# Patient Record
Sex: FEMALE | Race: WHITE | NOT HISPANIC OR LATINO | Employment: FULL TIME | URBAN - METROPOLITAN AREA
[De-identification: names, ages, dates, MRNs, and addresses within clinical notes are randomized per-mention and may not be internally consistent; named-entity substitution may affect disease eponyms.]

---

## 2024-03-02 ENCOUNTER — LAB (OUTPATIENT)
Dept: LAB | Facility: LAB | Age: 39
End: 2024-03-02
Payer: COMMERCIAL
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
## 2024-03-21 ENCOUNTER — LAB (OUTPATIENT)
Dept: LAB | Facility: LAB | Age: 39
End: 2024-03-21
Payer: COMMERCIAL
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
## 2024-03-21 LAB
HCV AB SER QL: NONREACTIVE
HIV 1+2 AB+HIV1 P24 AG SERPL QL IA: NONREACTIVE

## 2024-03-21 PROCEDURE — 87389 HIV-1 AG W/HIV-1&-2 AB AG IA: CPT

## 2024-03-21 PROCEDURE — 86803 HEPATITIS C AB TEST: CPT

## 2024-04-19 ENCOUNTER — OFFICE VISIT (OUTPATIENT)
Dept: PRIMARY CARE | Facility: CLINIC | Age: 39
End: 2024-04-19
Payer: COMMERCIAL

## 2024-04-19 VITALS — OXYGEN SATURATION: 98 % | DIASTOLIC BLOOD PRESSURE: 64 MMHG | HEART RATE: 80 BPM | SYSTOLIC BLOOD PRESSURE: 122 MMHG

## 2024-04-19 DIAGNOSIS — Z00.00 HEALTH CARE MAINTENANCE: ICD-10-CM

## 2024-04-19 DIAGNOSIS — G43.709 CHRONIC MIGRAINE WITHOUT AURA WITHOUT STATUS MIGRAINOSUS, NOT INTRACTABLE: Primary | ICD-10-CM

## 2024-04-19 PROCEDURE — 99214 OFFICE O/P EST MOD 30 MIN: CPT | Performed by: INTERNAL MEDICINE

## 2024-04-19 RX ORDER — NAPROXEN SODIUM 550 MG/1
550 TABLET ORAL
Qty: 60 TABLET | Refills: 11 | Status: SHIPPED | OUTPATIENT
Start: 2024-04-19 | End: 2025-04-19

## 2024-04-19 RX ORDER — SERTRALINE HYDROCHLORIDE 50 MG/1
50 TABLET, FILM COATED ORAL DAILY
COMMUNITY
End: 2024-04-19 | Stop reason: ALTCHOICE

## 2024-04-19 RX ORDER — CLINDAMYCIN PHOSPHATE 10 MG/G
GEL TOPICAL
COMMUNITY
Start: 2024-03-01

## 2024-04-19 RX ORDER — KETOCONAZOLE 20 MG/ML
SHAMPOO, SUSPENSION TOPICAL
COMMUNITY
Start: 2024-03-01

## 2024-04-19 RX ORDER — VENLAFAXINE HYDROCHLORIDE 75 MG/1
75 CAPSULE, EXTENDED RELEASE ORAL DAILY
Qty: 30 CAPSULE | Refills: 11 | Status: SHIPPED | OUTPATIENT
Start: 2024-04-19 | End: 2025-04-19

## 2024-04-19 RX ORDER — PNV 119/IRON FUM/FOLIC ACID 29 MG-1 MG
TABLET ORAL
COMMUNITY
Start: 2019-03-20

## 2024-04-19 NOTE — ASSESSMENT & PLAN NOTE
Most likely fairly typical migraine headaches which are getting more frequent.  The intensity level has not changed particularly.  Given her multiple triggers I think preventative medication makes sense we will start her on venlafaxine 75 mg daily.  She is already taking sertraline so we will discontinue that.  We will also have her start using a long-acting decongestant antihistamine combination like Claritin-D and Flonase nasal spray and regular basis.  She will keep a headache diary and see if she can identify other triggers.  We also referred her for a massage for her neck muscles as well as possibly acupuncture.  She will report back to me in 30 days to see how her headaches are doing if they are better we will continue current treatment program if not we will increase the dose of venlafaxine.  Otherwise he may want to try different preventative medication.

## 2024-04-19 NOTE — PROGRESS NOTES
Subjective   Patient ID: Linda Bhardwaj is a 38 y.o. female who presents for No chief complaint on file..    Patient here for initial visit.  Her main complaint today is chronic headaches.  Patient has a history of chronic headaches which sounds like migraine going back to the time of puberty.  She describes fairly typical migrainous type symptoms that are mostly centered around the left orbit they are moderate in severity initially no more than once or twice a week but now getting much more frequent as often as 3-4 times a week.  When the headaches become more severe they are associated with some visual loss.  She has no obvious aura.  She has multiple triggers including alcohol coffee beans and other foods.  She also finds that when she has seasonal allergies and sinus pressure congestion this the headaches develop and get worse.  She takes Advil Cold and Sinus or Excedrin extra strength.  She has never been on any type of preventative regimen.  She has never been actually diagnosed with migraine headache prior to this.  She was seen by dentist who made a bite block for her because of some TMJ symptoms she had somebody who gave her some Botox injections which did give her some relief of her headaches.  The headaches are fairly typically related to her menstrual cycles.  And when she was will control pills she was headache free during the estrogen phase and developed headaches during the withdrawal period.  She has no other neurologic symptoms other than the visual loss.  She has no family history of migraine.  She is otherwise healthy she takes sertraline for chronic anxiety/depression she has a lot of stress due to her small children and some stresses at work.         Review of Systems    Objective   /64   Pulse 80   SpO2 98%     Physical Exam  HENT:      Head: Normocephalic and atraumatic.      Right Ear: Tympanic membrane normal.      Left Ear: Tympanic membrane normal.      Nose: Nose normal.       Mouth/Throat:      Mouth: Mucous membranes are moist.      Pharynx: Oropharynx is clear.   Eyes:      Extraocular Movements: Extraocular movements intact.      Conjunctiva/sclera: Conjunctivae normal.      Pupils: Pupils are equal, round, and reactive to light.      Comments: Fundi benign bilaterally   Cardiovascular:      Rate and Rhythm: Normal rate.      Pulses: Normal pulses.      Heart sounds: Normal heart sounds.         Assessment/Plan   Problem List Items Addressed This Visit             ICD-10-CM    Chronic migraine without aura without status migrainosus, not intractable - Primary G43.709     Most likely fairly typical migraine headaches which are getting more frequent.  The intensity level has not changed particularly.  Given her multiple triggers I think preventative medication makes sense we will start her on venlafaxine 75 mg daily.  She is already taking sertraline so we will discontinue that.  We will also have her start using a long-acting decongestant antihistamine combination like Claritin-D and Flonase nasal spray and regular basis.  She will keep a headache diary and see if she can identify other triggers.  We also referred her for a massage for her neck muscles as well as possibly acupuncture.  She will report back to me in 30 days to see how her headaches are doing if they are better we will continue current treatment program if not we will increase the dose of venlafaxine.  Otherwise he may want to try different preventative medication.         Relevant Medications    venlafaxine XR (Effexor-XR) 75 mg 24 hr capsule    naproxen sodium (Anaprox DS) 550 mg tablet    Other Relevant Orders    CBC and Auto Differential    Urinalysis with Reflex Microscopic    Comprehensive Metabolic Panel    Lipid Panel    TSH with reflex to Free T4 if abnormal    Sedimentation Rate     Patient is not doing any Regular exercise.  She used to be a very regular exerciser.  I encouraged her to get back into a more  normal program she uses the Peloton cycle and told her trying to start back using at least 3 times a week.

## 2024-06-05 ENCOUNTER — OFFICE VISIT (OUTPATIENT)
Dept: OBSTETRICS AND GYNECOLOGY | Facility: CLINIC | Age: 39
End: 2024-06-05
Payer: COMMERCIAL

## 2024-06-05 VITALS
DIASTOLIC BLOOD PRESSURE: 80 MMHG | SYSTOLIC BLOOD PRESSURE: 116 MMHG | WEIGHT: 134 LBS | BODY MASS INDEX: 23.74 KG/M2 | HEIGHT: 63 IN

## 2024-06-05 DIAGNOSIS — Z01.419 ENCOUNTER FOR GYNECOLOGICAL EXAMINATION WITHOUT ABNORMAL FINDING: Primary | ICD-10-CM

## 2024-06-05 PROCEDURE — 99395 PREV VISIT EST AGE 18-39: CPT | Performed by: OBSTETRICS & GYNECOLOGY

## 2024-06-05 NOTE — PROGRESS NOTES
Subjective   Linda Bhardwaj is a 38 y.o. female here for a routine exam.  She has a history of LEEP in 2022 that showed MAYCOL-2 to 3.    She has regular cycles.  No clots or pain.  No discharge, no dysuria or change in bowel habits.    Her kids are now 3 and 5 years old.    Personal health questionnaire reviewed: yes.     Gynecologic History  Patient's last menstrual period was 2024.  Contraception: none  Last Pap: 23. Results were: normal  Last mammogram: n/a. Results were:  n/a    Obstetric History  OB History    Para Term  AB Living   3 2           SAB IAB Ectopic Multiple Live Births                  # Outcome Date GA Lbr Prabhakar/2nd Weight Sex Delivery Anes PTL Lv   3             2 Para            1 Para                Objective   Constitutional: Alert and in no acute distress. Well developed, well nourished.   Head and Face: Head and face: Normal.    Eyes: Normal external exam - nonicteric sclera, extraocular movements intact (EOMI) and no ptosis.   Neck: No neck asymmetry. Supple. Thyroid not enlarged and there were no palpable thyroid nodules.    Pulmonary: No respiratory distress.   Chest: Breasts: Normal appearance, no nipple discharge and no skin changes. Palpation of breasts and axillae: No palpable mass and no axillary lymphadenopathy.   Abdomen: Soft nontender; no abdominal mass palpated. No organomegaly. No hernias.   Genitourinary: External genitalia: Normal. No inguinal lymphadenopathy. Bartholin's Urethral and Skenes Glands: Normal. Urethra: Normal.  Bladder: Normal on palpation. Vagina: Normal. Cervix: Normal.  Uterus: Normal.  Right Adnexa/parametria: Normal.  Left Adnexa/parametria: Normal.  Inspection of Perianal Area: Normal.   Musculoskeletal: No joint swelling seen, normal movements of all extremities.   Skin: Normal skin color and pigmentation, normal skin turgor, and no rash.   Neurologic: Non-focal. Grossly intact.   Psychiatric: Alert and oriented x 3.  Affect normal to patient baseline. Mood: Appropriate.  Physical Exam     Assessment/Plan   Healthy female exam.  This is a 38-year-old female with a normal exam.   No Pap was sent, she is high risk HPV negative in 2023.    She is not on contraception, I do recommend multivitamin or prenatal vitamin with folic acid.   She is on Effexor, and she will call me if she conceives.    I will see her routinely in 1 year, or sooner as needed.  Contraception: none.

## 2024-07-02 ENCOUNTER — LAB (OUTPATIENT)
Dept: LAB | Facility: LAB | Age: 39
End: 2024-07-02
Payer: COMMERCIAL

## 2024-07-02 DIAGNOSIS — G43.709 CHRONIC MIGRAINE WITHOUT AURA WITHOUT STATUS MIGRAINOSUS, NOT INTRACTABLE: ICD-10-CM

## 2024-07-02 LAB
ALBUMIN SERPL BCP-MCNC: 4.4 G/DL (ref 3.4–5)
ALP SERPL-CCNC: 58 U/L (ref 33–110)
ALT SERPL W P-5'-P-CCNC: 13 U/L (ref 7–45)
ANION GAP SERPL CALC-SCNC: 11 MMOL/L (ref 10–20)
APPEARANCE UR: ABNORMAL
AST SERPL W P-5'-P-CCNC: 13 U/L (ref 9–39)
BASOPHILS # BLD AUTO: 0.03 X10*3/UL (ref 0–0.1)
BASOPHILS NFR BLD AUTO: 0.5 %
BILIRUB SERPL-MCNC: 0.5 MG/DL (ref 0–1.2)
BILIRUB UR STRIP.AUTO-MCNC: NEGATIVE MG/DL
BUN SERPL-MCNC: 9 MG/DL (ref 6–23)
CALCIUM SERPL-MCNC: 9.4 MG/DL (ref 8.6–10.6)
CHLORIDE SERPL-SCNC: 100 MMOL/L (ref 98–107)
CHOLEST SERPL-MCNC: 194 MG/DL (ref 0–199)
CHOLESTEROL/HDL RATIO: 2.9
CO2 SERPL-SCNC: 31 MMOL/L (ref 21–32)
COLOR UR: YELLOW
CREAT SERPL-MCNC: 0.69 MG/DL (ref 0.5–1.05)
EGFRCR SERPLBLD CKD-EPI 2021: >90 ML/MIN/1.73M*2
EOSINOPHIL # BLD AUTO: 0.04 X10*3/UL (ref 0–0.7)
EOSINOPHIL NFR BLD AUTO: 0.7 %
ERYTHROCYTE [DISTWIDTH] IN BLOOD BY AUTOMATED COUNT: 12.2 % (ref 11.5–14.5)
ERYTHROCYTE [SEDIMENTATION RATE] IN BLOOD BY WESTERGREN METHOD: 3 MM/H (ref 0–20)
GLUCOSE SERPL-MCNC: 92 MG/DL (ref 74–99)
GLUCOSE UR STRIP.AUTO-MCNC: NORMAL MG/DL
HCT VFR BLD AUTO: 39 % (ref 36–46)
HDLC SERPL-MCNC: 67.4 MG/DL
HGB BLD-MCNC: 12.9 G/DL (ref 12–16)
HYALINE CASTS #/AREA URNS AUTO: ABNORMAL /LPF
IMM GRANULOCYTES # BLD AUTO: 0.01 X10*3/UL (ref 0–0.7)
IMM GRANULOCYTES NFR BLD AUTO: 0.2 % (ref 0–0.9)
KETONES UR STRIP.AUTO-MCNC: NEGATIVE MG/DL
LDLC SERPL CALC-MCNC: 112 MG/DL
LEUKOCYTE ESTERASE UR QL STRIP.AUTO: NEGATIVE
LYMPHOCYTES # BLD AUTO: 1.05 X10*3/UL (ref 1.2–4.8)
LYMPHOCYTES NFR BLD AUTO: 18 %
MCH RBC QN AUTO: 29.8 PG (ref 26–34)
MCHC RBC AUTO-ENTMCNC: 33.1 G/DL (ref 32–36)
MCV RBC AUTO: 90 FL (ref 80–100)
MONOCYTES # BLD AUTO: 0.58 X10*3/UL (ref 0.1–1)
MONOCYTES NFR BLD AUTO: 10 %
MUCOUS THREADS #/AREA URNS AUTO: ABNORMAL /LPF
NEUTROPHILS # BLD AUTO: 4.11 X10*3/UL (ref 1.2–7.7)
NEUTROPHILS NFR BLD AUTO: 70.6 %
NITRITE UR QL STRIP.AUTO: NEGATIVE
NON HDL CHOLESTEROL: 127 MG/DL (ref 0–149)
NRBC BLD-RTO: 0 /100 WBCS (ref 0–0)
PH UR STRIP.AUTO: 8.5 [PH]
PLATELET # BLD AUTO: 218 X10*3/UL (ref 150–450)
POTASSIUM SERPL-SCNC: 3.9 MMOL/L (ref 3.5–5.3)
PROT SERPL-MCNC: 6.9 G/DL (ref 6.4–8.2)
PROT UR STRIP.AUTO-MCNC: ABNORMAL MG/DL
RBC # BLD AUTO: 4.33 X10*6/UL (ref 4–5.2)
RBC # UR STRIP.AUTO: NEGATIVE /UL
RBC #/AREA URNS AUTO: ABNORMAL /HPF
SODIUM SERPL-SCNC: 138 MMOL/L (ref 136–145)
SP GR UR STRIP.AUTO: 1.02
SQUAMOUS #/AREA URNS AUTO: ABNORMAL /HPF
TRIGL SERPL-MCNC: 75 MG/DL (ref 0–149)
TSH SERPL-ACNC: 0.98 MIU/L (ref 0.44–3.98)
UROBILINOGEN UR STRIP.AUTO-MCNC: NORMAL MG/DL
VLDL: 15 MG/DL (ref 0–40)
WBC # BLD AUTO: 5.8 X10*3/UL (ref 4.4–11.3)
WBC #/AREA URNS AUTO: ABNORMAL /HPF

## 2024-07-02 PROCEDURE — 80053 COMPREHEN METABOLIC PANEL: CPT

## 2024-07-02 PROCEDURE — 36415 COLL VENOUS BLD VENIPUNCTURE: CPT

## 2024-07-02 PROCEDURE — 85025 COMPLETE CBC W/AUTO DIFF WBC: CPT

## 2024-07-02 PROCEDURE — 81001 URINALYSIS AUTO W/SCOPE: CPT

## 2024-07-02 PROCEDURE — 85652 RBC SED RATE AUTOMATED: CPT

## 2024-07-02 PROCEDURE — 84443 ASSAY THYROID STIM HORMONE: CPT

## 2024-07-02 PROCEDURE — 80061 LIPID PANEL: CPT

## 2024-09-20 DIAGNOSIS — G43.709 CHRONIC MIGRAINE WITHOUT AURA WITHOUT STATUS MIGRAINOSUS, NOT INTRACTABLE: Primary | ICD-10-CM

## 2024-09-20 RX ORDER — RIZATRIPTAN BENZOATE 10 MG/1
10 TABLET, ORALLY DISINTEGRATING ORAL ONCE AS NEEDED
Qty: 9 TABLET | Refills: 0 | Status: SHIPPED | OUTPATIENT
Start: 2024-09-20 | End: 2024-10-20

## 2024-10-08 DIAGNOSIS — G43.709 CHRONIC MIGRAINE WITHOUT AURA WITHOUT STATUS MIGRAINOSUS, NOT INTRACTABLE: ICD-10-CM

## 2024-10-08 RX ORDER — RIZATRIPTAN BENZOATE 10 MG/1
10 TABLET, ORALLY DISINTEGRATING ORAL ONCE AS NEEDED
Qty: 20 TABLET | Refills: 3 | Status: SHIPPED | OUTPATIENT
Start: 2024-10-08 | End: 2024-12-27

## 2024-10-08 RX ORDER — VENLAFAXINE HYDROCHLORIDE 150 MG/1
150 CAPSULE, EXTENDED RELEASE ORAL DAILY
Qty: 30 CAPSULE | Refills: 11 | Status: SHIPPED | OUTPATIENT
Start: 2024-10-08 | End: 2025-10-08

## 2024-11-08 ENCOUNTER — TELEPHONE (OUTPATIENT)
Dept: OBSTETRICS AND GYNECOLOGY | Facility: CLINIC | Age: 39
End: 2024-11-08
Payer: COMMERCIAL

## 2024-11-08 DIAGNOSIS — O21.9 NAUSEA AND VOMITING IN PREGNANCY PRIOR TO 22 WEEKS GESTATION: Primary | ICD-10-CM

## 2024-11-08 RX ORDER — ONDANSETRON 4 MG/1
4 TABLET, ORALLY DISINTEGRATING ORAL EVERY 8 HOURS PRN
Qty: 20 TABLET | Refills: 1 | Status: SHIPPED | OUTPATIENT
Start: 2024-11-08 | End: 2024-11-15

## 2024-11-08 NOTE — TELEPHONE ENCOUNTER
Pt is pregnant and is very nauseous and throwing up everything in her body would like something called in for this, also she is taking venlasaxine 150mg and rizatripean 10 mg. Is she okay to take them or does she need to take some thing else. Please advise thank you.  Grace Garza MA

## 2024-11-08 NOTE — TELEPHONE ENCOUNTER
Patient is pregnant, and having severe nausea.  Zofran 4 mg ODT was ordered.  She is on venlafaxine and Maxalt for migraines.  I do recommend discontinuing the migraine medication, an alternative would be extra strength Tylenol.  You can continue the venlafaxine as the thought is the benefits of mom's mental health outweigh the risks.

## 2024-11-29 ENCOUNTER — TELEPHONE (OUTPATIENT)
Dept: OBSTETRICS AND GYNECOLOGY | Facility: CLINIC | Age: 39
End: 2024-11-29
Payer: COMMERCIAL

## 2024-11-29 DIAGNOSIS — O21.9 NAUSEA AND VOMITING IN PREGNANCY PRIOR TO 22 WEEKS GESTATION: ICD-10-CM

## 2024-11-29 NOTE — TELEPHONE ENCOUNTER
Pts  called in wife is very nauseous barley keeping anything down. Has very bad headache. The zofran isn't helping. She is able to hold down water. Ate toast this morning. She was throwing up in the middle of the night for a hour. She is barley moving around and sleeping a lot. Advised pt  to take her to ER to check for dehydration. He agreed.   Grace Garza MA

## 2024-12-02 RX ORDER — ONDANSETRON 4 MG/1
4 TABLET, ORALLY DISINTEGRATING ORAL EVERY 8 HOURS PRN
Qty: 20 TABLET | Refills: 1 | Status: SHIPPED | OUTPATIENT
Start: 2024-12-02 | End: 2024-12-09

## 2024-12-04 ENCOUNTER — APPOINTMENT (OUTPATIENT)
Dept: OBSTETRICS AND GYNECOLOGY | Facility: CLINIC | Age: 39
End: 2024-12-04
Payer: COMMERCIAL

## 2024-12-04 ENCOUNTER — TELEPHONE (OUTPATIENT)
Dept: GENETICS | Facility: CLINIC | Age: 39
End: 2024-12-04

## 2024-12-04 VITALS — WEIGHT: 144 LBS | DIASTOLIC BLOOD PRESSURE: 70 MMHG | BODY MASS INDEX: 25.51 KG/M2 | SYSTOLIC BLOOD PRESSURE: 120 MMHG

## 2024-12-04 DIAGNOSIS — O21.9 NAUSEA AND VOMITING IN PREGNANCY PRIOR TO 22 WEEKS GESTATION: ICD-10-CM

## 2024-12-04 DIAGNOSIS — Z11.3 SCREEN FOR SEXUALLY TRANSMITTED DISEASES: ICD-10-CM

## 2024-12-04 DIAGNOSIS — Z3A.08 8 WEEKS GESTATION OF PREGNANCY (HHS-HCC): ICD-10-CM

## 2024-12-04 DIAGNOSIS — R73.9 HYPERGLYCEMIA: ICD-10-CM

## 2024-12-04 DIAGNOSIS — O09.521 MULTIGRAVIDA OF ADVANCED MATERNAL AGE IN FIRST TRIMESTER (HHS-HCC): Primary | ICD-10-CM

## 2024-12-04 DIAGNOSIS — Z34.91 FIRST TRIMESTER PREGNANCY (HHS-HCC): ICD-10-CM

## 2024-12-04 PROBLEM — Z98.890 H/O LEEP: Status: ACTIVE | Noted: 2024-12-04

## 2024-12-04 PROBLEM — Z29.13 NEED FOR RHOGAM DUE TO RH NEGATIVE MOTHER: Status: ACTIVE | Noted: 2024-12-04

## 2024-12-04 PROBLEM — O34.219 PREVIOUS CESAREAN SECTION COMPLICATING PREGNANCY (HHS-HCC): Status: ACTIVE | Noted: 2024-12-04

## 2024-12-04 PROCEDURE — 87661 TRICHOMONAS VAGINALIS AMPLIF: CPT

## 2024-12-04 PROCEDURE — 0500F INITIAL PRENATAL CARE VISIT: CPT | Performed by: OBSTETRICS & GYNECOLOGY

## 2024-12-04 PROCEDURE — 87591 N.GONORRHOEAE DNA AMP PROB: CPT

## 2024-12-04 PROCEDURE — 87491 CHLMYD TRACH DNA AMP PROBE: CPT

## 2024-12-04 RX ORDER — ONDANSETRON 4 MG/1
4 TABLET, FILM COATED ORAL EVERY 8 HOURS PRN
Qty: 30 TABLET | Refills: 2 | Status: SHIPPED | OUTPATIENT
Start: 2024-12-04 | End: 2025-02-02

## 2024-12-04 RX ORDER — METOCLOPRAMIDE 10 MG/1
10 TABLET ORAL 4 TIMES DAILY
Qty: 40 TABLET | Refills: 2 | Status: SHIPPED | OUTPATIENT
Start: 2024-12-04 | End: 2024-12-14

## 2024-12-04 NOTE — PROGRESS NOTES
Subjective   Patient ID [id number]   Linda Bhardwaj is a 39 y.o.  at 8w3d with a working estimated date of delivery of 2025, by Last Menstrual Period who presents for an initial prenatal visit. This pregnancy is planned.    Her pregnancy is complicated by:  2 prior  sections, first in 2019 for breech followed by elective repeat 2021.    OB History    Para Term  AB Living   4 2           SAB IAB Ectopic Multiple Live Births                  # Outcome Date GA Lbr Prabhakar/2nd Weight Sex Type Anes PTL Lv   4 Current            3             2 Para            1 Para                   Objective   Physical Exam cx= L/T/C.  Weight: 65.3 kg (144 lb)  Expected Total Weight Gain: Could not be calculated   Pregravid BMI: Could not be calculated  BP: 120/70          Constitutional: Alert and in no acute distress. Well developed, well nourished.   Head and Face: Head and face: Normal.    Eyes: Normal external exam - nonicteric sclera.    Pulmonary: No respiratory distress.   Abdomen: Soft nontender; no abdominal mass palpated. No organomegaly. No hernias.   Genitourinary: External genitalia: Normal. No inguinal lymphadenopathy. Bartholin's Urethral and Skenes Glands: Normal. Urethra: Normal.  Bladder: Normal on palpation. Vagina: Normal. Cervix: Normal.  Uterus: Normal.  Right Adnexa/parametria: Normal.  Left Adnexa/parametria: Normal.   Musculoskeletal: No joint swelling seen, normal movements of all extremities.   Skin: Normal skin color and pigmentation, normal skin turgor, and no rash.   Neurologic: Non-focal. Grossly intact.   Psychiatric: Alert and oriented x 3. Affect normal to patient baseline. Mood: Appropriate.  OBGyn Exam    Prenatal Labs  Ordered.    Assessment/Plan     39-year-old  4 para 2-0-2-2 at 8.3 weeks gestation by LMP of 2024, due 2025.  Office ultrasound is consistent with LMP.    She is on Effexor 150 mg daily for migraines and plans to  continue.  She referred to genetics for AMA.    Routine OB labs or ordered.    She has significant nausea, refill for Zofran 4 mg and Reglan was ordered to her pharmacy.  Routine follow-up in 4 weeks, sooner as needed.  First trimester screening and second trimester screening discussed. Patient decided to referred to Genetics for AMA.  Age 39 at HIRA.  H/O LEEP, NT ultrasound ordered.  Follow up in 4 weeks for return OB visit.

## 2024-12-05 LAB
C TRACH RRNA SPEC QL NAA+PROBE: NEGATIVE
N GONORRHOEA DNA SPEC QL PROBE+SIG AMP: NEGATIVE
T VAGINALIS RRNA SPEC QL NAA+PROBE: NEGATIVE

## 2025-01-07 ENCOUNTER — LAB (OUTPATIENT)
Dept: LAB | Facility: LAB | Age: 40
End: 2025-01-07
Payer: COMMERCIAL

## 2025-01-07 DIAGNOSIS — O09.521 MULTIGRAVIDA OF ADVANCED MATERNAL AGE IN FIRST TRIMESTER (HHS-HCC): ICD-10-CM

## 2025-01-07 DIAGNOSIS — R73.9 HYPERGLYCEMIA: ICD-10-CM

## 2025-01-07 DIAGNOSIS — E55.9 VITAMIN D DEFICIENCY: Primary | ICD-10-CM

## 2025-01-07 DIAGNOSIS — Z11.3 SCREEN FOR SEXUALLY TRANSMITTED DISEASES: ICD-10-CM

## 2025-01-07 LAB
25(OH)D3 SERPL-MCNC: 21 NG/ML (ref 30–100)
ABO GROUP (TYPE) IN BLOOD: NORMAL
ANTIBODY SCREEN: NORMAL
ERYTHROCYTE [DISTWIDTH] IN BLOOD BY AUTOMATED COUNT: 13.3 % (ref 11.5–14.5)
EST. AVERAGE GLUCOSE BLD GHB EST-MCNC: 94 MG/DL
HBA1C MFR BLD: 4.9 %
HBV SURFACE AG SERPL QL IA: NONREACTIVE
HCT VFR BLD AUTO: 37.5 % (ref 36–46)
HCV AB SER QL: NONREACTIVE
HGB BLD-MCNC: 12.6 G/DL (ref 12–16)
HIV 1+2 AB+HIV1 P24 AG SERPL QL IA: NONREACTIVE
MCH RBC QN AUTO: 30.3 PG (ref 26–34)
MCHC RBC AUTO-ENTMCNC: 33.6 G/DL (ref 32–36)
MCV RBC AUTO: 90 FL (ref 80–100)
NRBC BLD-RTO: 0 /100 WBCS (ref 0–0)
PLATELET # BLD AUTO: 230 X10*3/UL (ref 150–450)
RBC # BLD AUTO: 4.16 X10*6/UL (ref 4–5.2)
REFLEX ADDED, ANEMIA PANEL: NORMAL
RH FACTOR (ANTIGEN D): NORMAL
RUBV IGG SERPL IA-ACNC: 6.2 IA
RUBV IGG SERPL QL IA: POSITIVE
TREPONEMA PALLIDUM IGG+IGM AB [PRESENCE] IN SERUM OR PLASMA BY IMMUNOASSAY: NONREACTIVE
TSH SERPL-ACNC: 0.77 MIU/L (ref 0.44–3.98)
WBC # BLD AUTO: 11.4 X10*3/UL (ref 4.4–11.3)

## 2025-01-07 PROCEDURE — 83036 HEMOGLOBIN GLYCOSYLATED A1C: CPT

## 2025-01-07 PROCEDURE — 86780 TREPONEMA PALLIDUM: CPT

## 2025-01-07 PROCEDURE — 84443 ASSAY THYROID STIM HORMONE: CPT

## 2025-01-07 PROCEDURE — 82306 VITAMIN D 25 HYDROXY: CPT

## 2025-01-07 PROCEDURE — 86901 BLOOD TYPING SEROLOGIC RH(D): CPT

## 2025-01-07 PROCEDURE — 83021 HEMOGLOBIN CHROMOTOGRAPHY: CPT

## 2025-01-07 PROCEDURE — 86900 BLOOD TYPING SEROLOGIC ABO: CPT

## 2025-01-07 PROCEDURE — 87340 HEPATITIS B SURFACE AG IA: CPT

## 2025-01-07 PROCEDURE — 87389 HIV-1 AG W/HIV-1&-2 AB AG IA: CPT

## 2025-01-07 PROCEDURE — 85027 COMPLETE CBC AUTOMATED: CPT

## 2025-01-07 PROCEDURE — 86317 IMMUNOASSAY INFECTIOUS AGENT: CPT

## 2025-01-07 PROCEDURE — 86850 RBC ANTIBODY SCREEN: CPT

## 2025-01-07 PROCEDURE — 83020 HEMOGLOBIN ELECTROPHORESIS: CPT | Performed by: OBSTETRICS & GYNECOLOGY

## 2025-01-07 PROCEDURE — 86803 HEPATITIS C AB TEST: CPT

## 2025-01-07 RX ORDER — ERGOCALCIFEROL 1.25 MG/1
1.25 CAPSULE ORAL WEEKLY
Qty: 4 CAPSULE | Refills: 11 | Status: SHIPPED | OUTPATIENT
Start: 2025-01-07 | End: 2026-01-07

## 2025-01-07 NOTE — PROGRESS NOTES
"Thank you for the referral of Ms. Linda Bhardwaj. she is a 39 y.o.,  female who was 13 and 3/7 weeks pregnant at the time of our appointment with an EDC of 2025.  She was seen for genetic counseling with her , Chris, due to advanced maternal age.    PAST HISTORY:   Per prior OB note: 2 prior  sections, first in  for breech followed by elective repeat 2021.   The couple's first pregnancy together resulted in a 9 week missed miscarriage. Product of conception genetic testing was sent and identified Trisomy 18.   The second pregnancy resulted in the birth of their now 5 year old daughter; there was an early twin demise in that pregnancy.  Third pregnancy together resulted in the birth of their now 3 year old daughter.    Ultrasounds in the current pregnancy:  Per OB note on 2024: \"Office ultrasound is consistent with LMP\"    Prior aneuploidy screening:  None in current pregnancy     Prior carrier screening:  Patient had carrier screening for 283 conditions through [facility], reported 2019. 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 We briefly reviewed the clinical features of these disorders (individuals with FMF can have bouts of fever, inflammation, joint and abdominal pain, and some individuals can also have renal complications, while individuals with Arginosuccinic Aciduria can have vomiting, lethargy, hypothermia, ammonia build up in the blood, particularly in periods of stress/illness, which can lead to ADHD, developmental delay, seizures, and liver disease; however this disorder is managed with low protein diet and medications). Autosomal recessive nature of these disorders also reviewed, both partners would need to be carriers of the same condition in order to be at risk to have an affected " "child. Since pt's partner tested negative for FMF, his carrier risk is reduced by 97% from 1/40 to 1/1200, with overall risk of 1/4800 for an affected child. Since patient tested negative for arginosuccinic aciduria, her carrier risk is reduced by 90% from 1/117 to 1 in 1200, with overall risk of 1/4800 for an affected child. \"    Patient otherwise denies complications such as bleeding or cramping, exposures, infection/illness, and travel outside of the [place] since finding out she was pregnant.    FAMILY HISTORY  Medical and family histories were previously reviewed at the patient's genetic counseling appointment with Kailyn Bello on 03/20/2019. Other than the birth of their 2 healthy children, no changes to the family history have been reported.     COUNSELING:    The following information was discussed with your patient:    The general population risk of 3-5% for birth defects in every pregnancy.  Chromosomes, genes, non-disjunction, and features of common aneuploidies associated with advanced maternal age were discussed, including Trisomy 13, Trisomy 18, Trisomy 21, and sex chromosome aneuploidies.  Having a previous pregnancy with Trisomy 18 increased the risk to have another pregnancy with Trisomy 18 by 3.9 fold. The risk for another viable trisomy (Trisomy 13 or Trisomy 21) is increased by 4.0-fold, due to this history (PMID: [id number]).   Based on the age of 39 at EDC alone, at this gestation, the risk for the fetus to have Down syndrome is approximately 1 in 130.  Due to the history of previous pregnancy with Trisomy 18, this risk is increased to 1 in 33 (3%).   Based on the age of 39 at EDC alone, at this gestation, the risk for the fetus to have Trisomy 18 is approximately 1 in 565  Due to the history of previous pregnancy with Trisomy 18, this risk is increased to 1 in 145 (0.69%).   This does not include copy number variation, mosaicism, single gene disorders, translocations, and marker chromosomes.   The " availability, benefits and limitations of ultrasound study. An ultrasound study is recommended between 11-14 weeks gestation including nuchal translucency measurement, which is scheduled for tomorrow (01/09/2025), and at 18-20 weeks gestation to survey fetal organs. An ultrasound study in the second trimester can identify 50% of Down syndrome cases and 90% of trisomy 18 or trisomy 13 cases.   The availability, benefits and limitations of prenatal cell-free DNA screening.  We discussed the methodology for this testing, which includes using cell-free DNA obtained from a mother's blood to screen for the presence of common chromosomal abnormalities. Depending on the laboratory used, there is a >99% sensitivity for Down syndrome, at least 97.4% sensitivity for trisomy 18, and at least 87.5% sensitivity for trisomy 13.  Specificity for these trisomies is >99%. Although sensitivity and specificity rates are high, not all positive results are confirmed to be true positives. False negative results are rare. Therefore, in the event of an abnormal result, prenatal diagnosis through amniocentesis should be considered to confirm the findings.  Results take approximately 7-10 days.    The availability of diagnostic fetal chromosome analysis via chorionic villus sampling. The methods, benefits, limitations and risks of CVS including an approximate 1 in 200 risk of complications, including a 1 in 400 risk for miscarriage. The 0.1-1% risk of confined placental mosaicism in CVS was discussed.   The availability of diagnostic fetal chromosome analysis via amniocentesis. The methods, benefits, limitations and risks of amniocentesis and a 1 in 400 risk of complications, including a 1 in 800 risk of miscarriage.  The couple previously had carrier screening for 283 conditions. We discussed that this screening included all genes recommended by ACOG and ACMG, and while screening for more genes is available, it is not specifically  recommended. Because they are not carriers for the same conditions, reproductive risk is low. We discussed that when their children are older, they should be informed of this carrier status so that they and any future reproductive partners can consider their own screening before having children.       DISPOSITION:  The patient stated that she understood the above information and elected to proceed with cell free DNA screening via [id number] ([facility]) for Trisomy 13, Trisomy 18, Trisomy 21, and sex chromosome abnormalities. Order was placed and kit was handed to patient. Results will be available in 7-10 days, at which time she will be contacted to review and to discuss next steps.     No additional carrier screening recommended today. Diagnostic testing declined today.     GHADA Singer spent 20 minutes with the patient with greater than 50% of the time spent in face to face counseling.     Thank you for allowing us to participate in the care of your patient.  Should you or your patient have any questions, please do not hesitate to contact our office at [phone].    Sincerely,   Radha Martinez MS, [facility]  Licensed and Certified Genetic Counselor     Reviewed by:  Dr. Amita Blum MD  Maternal Fetal Medicine

## 2025-01-07 NOTE — RESULT ENCOUNTER NOTE
The vitamin D level was low at 21.  The goal is 30 and above.  I ordered prescription strength vitamin D to your pharmacy.  You will take 1 capsule once a week for the pregnancy.  We will check the vitamin D level again at 28 weeks.

## 2025-01-08 ENCOUNTER — APPOINTMENT (OUTPATIENT)
Dept: OBSTETRICS AND GYNECOLOGY | Facility: CLINIC | Age: 40
End: 2025-01-08
Payer: COMMERCIAL

## 2025-01-08 ENCOUNTER — CLINICAL SUPPORT (OUTPATIENT)
Dept: GENETICS | Facility: CLINIC | Age: 40
End: 2025-01-08
Payer: COMMERCIAL

## 2025-01-08 VITALS — WEIGHT: 157 LBS | BODY MASS INDEX: 27.81 KG/M2 | SYSTOLIC BLOOD PRESSURE: 120 MMHG | DIASTOLIC BLOOD PRESSURE: 70 MMHG

## 2025-01-08 DIAGNOSIS — Z3A.13 13 WEEKS GESTATION OF PREGNANCY (HHS-HCC): Primary | ICD-10-CM

## 2025-01-08 DIAGNOSIS — O09.291: ICD-10-CM

## 2025-01-08 DIAGNOSIS — Z31.5 ENCOUNTER FOR PROCREATIVE GENETIC COUNSELING: ICD-10-CM

## 2025-01-08 DIAGNOSIS — O09.521 MULTIGRAVIDA OF ADVANCED MATERNAL AGE IN FIRST TRIMESTER (HHS-HCC): ICD-10-CM

## 2025-01-08 DIAGNOSIS — Z34.92 SECOND TRIMESTER PREGNANCY (HHS-HCC): ICD-10-CM

## 2025-01-08 DIAGNOSIS — Z14.8 GENETIC CARRIER: ICD-10-CM

## 2025-01-08 PROBLEM — O21.9 NAUSEA AND VOMITING IN PREGNANCY PRIOR TO 22 WEEKS GESTATION: Status: ACTIVE | Noted: 2025-01-08

## 2025-01-08 LAB
HEMOGLOBIN A2: 3.3 % (ref 2–3.5)
HEMOGLOBIN A: 96.4 % (ref 95.8–98)
HEMOGLOBIN F: 0.3 % (ref 0–2)
HEMOGLOBIN IDENTIFICATION INTERPRETATION: NORMAL
PATH REVIEW-HGB IDENTIFICATION: NORMAL

## 2025-01-08 PROCEDURE — 0501F PRENATAL FLOW SHEET: CPT | Performed by: OBSTETRICS & GYNECOLOGY

## 2025-01-08 NOTE — PROGRESS NOTES
"Subjective   Patient ID [id number]   Linda Bhardwaj is a 39 y.o.  at 13w3d with a working estimated date of delivery of 2025, by Last Menstrual Period who presents for a routine prenatal visit. She denies vaginal bleeding, leakage of fluid, decreased fetal movements, or contractions.    Her pregnancy is complicated by:  Previous  section x 2.  AMA, genetics today, plan NT ultrasound tomorrow.    Migraine headaches, nausea vomiting.     Rh-, RhoGAM candidate.    History of LEEP 2022.    Objective   Physical Exam  Weight: 71.2 kg (157 lb)  Expected Total Weight Gain: Could not be calculated   Pregravid BMI: Could not be calculated  BP: 120/70         Prenatal Labs  Urine dip:  Lab Results   Component Value Date    KETONESU NEGATIVE 2024       Lab Results   Component Value Date    HGB 12.6 2025    HCT 37.5 2025    ABO O 2025    HEPBSAG Nonreactive 2025     No results found for: \"PAPPA\", \"AFP\", \"HCG\", \"ESTRIOL\", \"INHBA\"  No results found for: \"GLUF\", \"GLUT1\", \"[id number]\", \"[id number]\"    Imaging  The most recent ultrasound was performed on NT ultrasound scheduled for 25 The most recent ultrasound study is not finalized with a study GA of The most recent ultrasound study is not finalized and EFW of The most recent ultrasound study is not finalized.  The most recent ultrasound study is not finalized  The most recent ultrasound study is not finalized    Assessment/Plan       Continue prenatal vitamin.  Labs reviewed.  Rhogam at 28 weeks.  Follow up in 4 weeks for a routine prenatal visit.  "

## 2025-01-09 ENCOUNTER — HOSPITAL ENCOUNTER (OUTPATIENT)
Dept: RADIOLOGY | Facility: CLINIC | Age: 40
Discharge: HOME | End: 2025-01-09
Payer: COMMERCIAL

## 2025-01-09 DIAGNOSIS — O09.521 MULTIGRAVIDA OF ADVANCED MATERNAL AGE IN FIRST TRIMESTER (HHS-HCC): ICD-10-CM

## 2025-01-09 DIAGNOSIS — Z36.82 ENCOUNTER FOR NUCHAL TRANSLUCENCY TESTING (HHS-HCC): ICD-10-CM

## 2025-01-09 PROCEDURE — 76813 OB US NUCHAL MEAS 1 GEST: CPT

## 2025-01-20 DIAGNOSIS — R68.89 FLU-LIKE SYMPTOMS: Primary | ICD-10-CM

## 2025-01-20 RX ORDER — OSELTAMIVIR PHOSPHATE 75 MG/1
75 CAPSULE ORAL EVERY 12 HOURS
Qty: 10 CAPSULE | Refills: 0 | Status: SHIPPED | OUTPATIENT
Start: 2025-01-20 | End: 2025-01-25

## 2025-01-29 PROCEDURE — 9990000009 MISCELLANEOUS GENETICS TEST: Performed by: STUDENT IN AN ORGANIZED HEALTH CARE EDUCATION/TRAINING PROGRAM

## 2025-01-30 ENCOUNTER — LAB (OUTPATIENT)
Dept: LAB | Facility: HOSPITAL | Age: 40
End: 2025-01-30
Payer: COMMERCIAL

## 2025-01-30 DIAGNOSIS — O09.521 MULTIGRAVIDA OF ADVANCED MATERNAL AGE IN FIRST TRIMESTER (HHS-HCC): ICD-10-CM

## 2025-02-03 LAB
COMMENTS - MP RESULT TYPE: NORMAL
SCAN RESULT: NORMAL

## 2025-02-05 ENCOUNTER — APPOINTMENT (OUTPATIENT)
Dept: OBSTETRICS AND GYNECOLOGY | Facility: CLINIC | Age: 40
End: 2025-02-05
Payer: COMMERCIAL

## 2025-02-05 VITALS — WEIGHT: 158 LBS | DIASTOLIC BLOOD PRESSURE: 68 MMHG | SYSTOLIC BLOOD PRESSURE: 112 MMHG | BODY MASS INDEX: 27.99 KG/M2

## 2025-02-05 DIAGNOSIS — Z34.92 SECOND TRIMESTER PREGNANCY (HHS-HCC): Primary | ICD-10-CM

## 2025-02-05 DIAGNOSIS — N76.0 ACUTE VAGINITIS: ICD-10-CM

## 2025-02-05 DIAGNOSIS — Z3A.17 17 WEEKS GESTATION OF PREGNANCY (HHS-HCC): ICD-10-CM

## 2025-02-05 PROCEDURE — 0501F PRENATAL FLOW SHEET: CPT | Performed by: OBSTETRICS & GYNECOLOGY

## 2025-02-05 RX ORDER — TERCONAZOLE 8 MG/G
1 CREAM VAGINAL NIGHTLY
Qty: 20 G | Refills: 0 | Status: SHIPPED | OUTPATIENT
Start: 2025-02-05 | End: 2025-02-08

## 2025-02-05 NOTE — PROGRESS NOTES
"Subjective   Patient ID [id number]   Linda Bhardwaj is a 39 y.o.  at 17w3d with a working estimated date of delivery of 2025, by Last Menstrual Period who presents for a routine prenatal visit. She denies vaginal bleeding, leakage of fluid, decreased fetal movements, or contractions.    Her pregnancy is complicated by:  AMA, normal NIPT, girl.  History of LEEP.  Rh-.  On Effexor for migraines, and depression.  Taking baby aspirin.    Objective   Physical Exam  Weight: 71.7 kg (158 lb)  Expected Total Weight Gain: Could not be calculated   Pregravid BMI: Could not be calculated  BP: 112/68         Prenatal Labs  Urine dip:  Lab Results   Component Value Date    KETONESU NEGATIVE 2024       Lab Results   Component Value Date    HGB 12.6 2025    HCT 37.5 2025    ABO O 2025    HEPBSAG Nonreactive 2025     No results found for: \"PAPPA\", \"AFP\", \"HCG\", \"ESTRIOL\", \"INHBA\"  No results found for: \"GLUF\", \"GLUT1\", \"SVRFISS1OF\", \"[id number]\"    Imaging  The most recent ultrasound was performed on  25 with a study GA of 13.4 weeks  and HIRA of 25 .          Assessment/Plan     39-year-old  4 para 2 at 17.3 weeks.  She does mention itchiness vaginally for at least a month.  Discussed Terazol 3 vaginal cream, this was ordered.  Continue prenatal vitamin.  Labs reviewed.  Rhogam  at 28 weeks  GTT  at 28 weeks.  Follow up in 4 weeks for a routine prenatal visit.  "

## 2025-02-19 ENCOUNTER — HOSPITAL ENCOUNTER (OUTPATIENT)
Dept: RADIOLOGY | Facility: CLINIC | Age: 40
Discharge: HOME | End: 2025-02-19
Payer: COMMERCIAL

## 2025-02-19 DIAGNOSIS — O09.521 MULTIGRAVIDA OF ADVANCED MATERNAL AGE IN FIRST TRIMESTER (HHS-HCC): ICD-10-CM

## 2025-02-19 PROCEDURE — 76811 OB US DETAILED SNGL FETUS: CPT | Performed by: STUDENT IN AN ORGANIZED HEALTH CARE EDUCATION/TRAINING PROGRAM

## 2025-02-19 PROCEDURE — 76811 OB US DETAILED SNGL FETUS: CPT

## 2025-03-12 ENCOUNTER — APPOINTMENT (OUTPATIENT)
Dept: OBSTETRICS AND GYNECOLOGY | Facility: CLINIC | Age: 40
End: 2025-03-12
Payer: COMMERCIAL

## 2025-03-12 VITALS — DIASTOLIC BLOOD PRESSURE: 68 MMHG | BODY MASS INDEX: 28.87 KG/M2 | SYSTOLIC BLOOD PRESSURE: 110 MMHG | WEIGHT: 163 LBS

## 2025-03-12 DIAGNOSIS — Z34.92 SECOND TRIMESTER PREGNANCY (HHS-HCC): Primary | ICD-10-CM

## 2025-03-12 DIAGNOSIS — Z3A.22 22 WEEKS GESTATION OF PREGNANCY (HHS-HCC): ICD-10-CM

## 2025-03-12 PROCEDURE — 0501F PRENATAL FLOW SHEET: CPT | Performed by: OBSTETRICS & GYNECOLOGY

## 2025-03-12 NOTE — PROGRESS NOTES
"Subjective   Patient ID [id number]   Linda Bhardwaj is a 39 y.o.  at 22w3d with a working estimated date of delivery of 2025, by Last Menstrual Period who presents for a routine prenatal visit. She denies vaginal bleeding, leakage of fluid, decreased fetal movements, or contractions.    Her pregnancy is complicated by:  AMA, normal girl.  Previous c/section.    Objective   Physical Exam  Weight: 73.9 kg (163 lb)  Expected Total Weight Gain: Could not be calculated   Pregravid BMI: Could not be calculated  BP: 110/68         Prenatal Labs  Urine dip:  Lab Results   Component Value Date    KETONESU NEGATIVE 2024       Lab Results   Component Value Date    HGB 12.6 2025    HCT 37.5 2025    ABO O 2025    HEPBSAG Nonreactive 2025     No results found for: \"PAPPA\", \"AFP\", \"HCG\", \"ESTRIOL\", \"INHBA\"  No results found for: \"GLUF\", \"GLUT1\", \"[id number]\", \"[id number]\"    Imaging  The most recent ultrasound was performed on  25 with a study GA of 19.3 weeks  and EFW of  42%tile. .          Assessment/Plan       Continue prenatal vitamin.  Labs reviewed.  Rhogam  at next visit in 5 weeks.  Follow up in 5 weeks for a routine prenatal visit.  "

## 2025-04-09 ENCOUNTER — APPOINTMENT (OUTPATIENT)
Dept: OBSTETRICS AND GYNECOLOGY | Facility: CLINIC | Age: 40
End: 2025-04-09
Payer: COMMERCIAL

## 2025-04-16 ENCOUNTER — LAB (OUTPATIENT)
Dept: LAB | Facility: HOSPITAL | Age: 40
End: 2025-04-16
Payer: COMMERCIAL

## 2025-04-16 ENCOUNTER — APPOINTMENT (OUTPATIENT)
Dept: OBSTETRICS AND GYNECOLOGY | Facility: CLINIC | Age: 40
End: 2025-04-16
Payer: COMMERCIAL

## 2025-04-16 VITALS — DIASTOLIC BLOOD PRESSURE: 72 MMHG | WEIGHT: 169 LBS | BODY MASS INDEX: 29.94 KG/M2 | SYSTOLIC BLOOD PRESSURE: 120 MMHG

## 2025-04-16 DIAGNOSIS — Z34.92 SECOND TRIMESTER PREGNANCY (HHS-HCC): ICD-10-CM

## 2025-04-16 DIAGNOSIS — Z29.13 NEED FOR RHOGAM DUE TO RH NEGATIVE MOTHER: Primary | ICD-10-CM

## 2025-04-16 DIAGNOSIS — Z3A.27 27 WEEKS GESTATION OF PREGNANCY (HHS-HCC): ICD-10-CM

## 2025-04-16 DIAGNOSIS — R73.09 GLUCOSE TOLERANCE TEST ABNORMAL: ICD-10-CM

## 2025-04-16 DIAGNOSIS — Z34.92 ENCOUNTER FOR SUPERVISION OF NORMAL PREGNANCY, UNSPECIFIED, SECOND TRIMESTER: Primary | ICD-10-CM

## 2025-04-16 PROBLEM — O99.810 ABNORMAL GLUCOSE TOLERANCE AFFECTING PREGNANCY, ANTEPARTUM (HHS-HCC): Status: ACTIVE | Noted: 2025-04-16

## 2025-04-16 LAB
ABO GROUP (TYPE) IN BLOOD: NORMAL
ANTIBODY SCREEN: NORMAL
ERYTHROCYTE [DISTWIDTH] IN BLOOD BY AUTOMATED COUNT: 12.7 % (ref 11.5–14.5)
HCT VFR BLD AUTO: 34.4 % (ref 36–46)
HGB BLD-MCNC: 11.4 G/DL (ref 12–16)
MCH RBC QN AUTO: 30.5 PG (ref 26–34)
MCHC RBC AUTO-ENTMCNC: 33.1 G/DL (ref 32–36)
MCV RBC AUTO: 92 FL (ref 80–100)
NRBC BLD-RTO: 0 /100 WBCS (ref 0–0)
PLATELET # BLD AUTO: 207 X10*3/UL (ref 150–450)
RBC # BLD AUTO: 3.74 X10*6/UL (ref 4–5.2)
REFLEX ADDED, ANEMIA PANEL: NORMAL
RH FACTOR (ANTIGEN D): NORMAL
WBC # BLD AUTO: 10.2 X10*3/UL (ref 4.4–11.3)

## 2025-04-16 PROCEDURE — 85027 COMPLETE CBC AUTOMATED: CPT

## 2025-04-16 PROCEDURE — 86850 RBC ANTIBODY SCREEN: CPT

## 2025-04-16 PROCEDURE — 86900 BLOOD TYPING SEROLOGIC ABO: CPT

## 2025-04-16 PROCEDURE — 86901 BLOOD TYPING SEROLOGIC RH(D): CPT

## 2025-04-16 PROCEDURE — 96372 THER/PROPH/DIAG INJ SC/IM: CPT | Performed by: OBSTETRICS & GYNECOLOGY

## 2025-04-16 PROCEDURE — 0501F PRENATAL FLOW SHEET: CPT | Performed by: OBSTETRICS & GYNECOLOGY

## 2025-04-16 NOTE — PROGRESS NOTES
"Subjective   Patient ID [id number]   Linda Bhardwaj is a 39 y.o.  at 27w3d with a working estimated date of delivery of 2025, by Last Menstrual Period who presents for a routine prenatal visit. She denies vaginal bleeding, leakage of fluid, decreased fetal movements, or contractions.    Her pregnancy is complicated by:  Rh-, RhoGAM given today on 2025 at 27.3 weeks.  Lab forgot to draw ABO Rh.  Therefore ABO Rh will be drawn after RhoGAM was given.  Elevated 1 hour Glucola (152).  3-hour Glucola ordered today.    History of LEEP.  AMA.  Anxiety depression, on Effexor.  Taking 162 mg aspirin daily.    Objective   Physical Exam  Weight: 76.7 kg (169 lb)  Expected Total Weight Gain: Could not be calculated   Pregravid BMI: Could not be calculated  BP: 120/72         Prenatal Labs  Urine dip:  Lab Results   Component Value Date    KETONESU NEGATIVE 2024       Lab Results   Component Value Date    HGB 12.6 2025    HCT 37.5 2025    ABO O 2025    HEPBSAG Nonreactive 2025     No results found for: \"PAPPA\", \"AFP\", \"HCG\", \"ESTRIOL\", \"INHBA\"  No results found for: \"GLUF\", \"GLUT1\", \"GNXOSFU1PD\", \"IRUTTTY3UU\"    Imaging  The most recent ultrasound was performed on 25  with a study GA of   19.3 weeks and EFW of  42%tile. .          Assessment/Plan       Continue prenatal vitamin.  Labs reviewed.  Rhogam given today right deltoid.  Lot [id number]  [id number]. (Before ABO rh drawn, as lab forgot to draw yesterday...)  GTT =152, ordered 3 hr gtt..  Follow up in 2 weeks for a routine prenatal visit.  "

## 2025-04-18 LAB
25(OH)D3+25(OH)D2 SERPL-MCNC: 31 NG/ML (ref 30–100)
GLUCOSE 1H P 50 G GLC PO SERPL-MCNC: 152 MG/DL
T PALLIDUM AB SER QL IA: NEGATIVE

## 2025-04-23 LAB
GLUCOSE 1H P CHAL SERPL-MCNC: 139 MG/DL
GLUCOSE 2H P CHAL SERPL-MCNC: 64 MG/DL
GLUCOSE 3H P 100 G GLC PO SERPL-MCNC: 71 MG/DL
GLUCOSE P FAST SERPL-MCNC: 78 MG/DL (ref 65–94)
SERVICE CMNT-IMP: ABNORMAL

## 2025-04-23 NOTE — RESULT ENCOUNTER NOTE
The 3-hour Glucola is normal, no evidence of gestational diabetes.  Due to elevated 1 hour Glucola I still recommend limiting the intake of sugars and carbohydrates.

## 2025-04-25 ENCOUNTER — TELEPHONE (OUTPATIENT)
Dept: PRIMARY CARE | Facility: CLINIC | Age: 40
End: 2025-04-25
Payer: COMMERCIAL

## 2025-04-25 DIAGNOSIS — H10.33 ACUTE BACTERIAL CONJUNCTIVITIS OF BOTH EYES: Primary | ICD-10-CM

## 2025-04-25 RX ORDER — TOBRAMYCIN 3 MG/ML
1 SOLUTION/ DROPS OPHTHALMIC EVERY 4 HOURS
Qty: 5 ML | Refills: 0 | Status: SHIPPED | OUTPATIENT
Start: 2025-04-25 | End: 2025-05-02

## 2025-04-25 NOTE — TELEPHONE ENCOUNTER
Patient thinks she has pink eye. Her daughter had it recently.    She complains of pain, puffiness, redness and when she wakes up it is crusty and swollen shut. She wonders if you will phone in something to [facility] [employer].

## 2025-05-07 ENCOUNTER — ROUTINE PRENATAL (OUTPATIENT)
Dept: OBSTETRICS AND GYNECOLOGY | Facility: CLINIC | Age: 40
End: 2025-05-07
Payer: COMMERCIAL

## 2025-05-07 ENCOUNTER — APPOINTMENT (OUTPATIENT)
Dept: OBSTETRICS AND GYNECOLOGY | Facility: CLINIC | Age: 40
End: 2025-05-07
Payer: COMMERCIAL

## 2025-05-07 VITALS — SYSTOLIC BLOOD PRESSURE: 118 MMHG | WEIGHT: 172 LBS | BODY MASS INDEX: 30.47 KG/M2 | DIASTOLIC BLOOD PRESSURE: 66 MMHG

## 2025-05-07 DIAGNOSIS — O34.219 PREVIOUS CESAREAN SECTION COMPLICATING PREGNANCY (HHS-HCC): ICD-10-CM

## 2025-05-07 DIAGNOSIS — Z34.93 THIRD TRIMESTER PREGNANCY (HHS-HCC): Primary | ICD-10-CM

## 2025-05-07 DIAGNOSIS — Z3A.30 30 WEEKS GESTATION OF PREGNANCY (HHS-HCC): ICD-10-CM

## 2025-05-07 PROCEDURE — 0501F PRENATAL FLOW SHEET: CPT | Performed by: OBSTETRICS & GYNECOLOGY

## 2025-05-07 NOTE — PROGRESS NOTES
"Subjective   Patient ID [id number]   Linda Bhardwaj is a 39 y.o.  at 30w3d with a working estimated date of delivery of 2025, by Last Menstrual Period who presents for a routine prenatal visit. She denies vaginal bleeding, leakage of fluid, decreased fetal movements, or contractions.    Her pregnancy is complicated by:  Elevated 1 hour Glucola (152) normal 3-hour Glucola.    Rh-, RhoGAM given 2025.    History of LEEP biopsy.  AMA.    Objective   Physical Exam:   Weight: 78 kg (172 lb)  Expected Total Weight Gain: Could not be calculated   Pregravid BMI: Could not be calculated  BP: 118/66                  Prenatal Labs  Urine Dip:  Lab Results   Component Value Date    KETONESU NEGATIVE 2024     Lab Results   Component Value Date    HGB 11.4 (L) 2025    HCT 34.4 (L) 2025    ABO O 2025    HEPBSAG Nonreactive 2025     No results found for: \"PAPPA\", \"AFP\", \"HCG\", \"ESTRIOL\", \"INHBA\"  No results found for: \"GLUF\", \"GLUT1\", \"YLNIWCX9OL\", \"OUBRDDH2ON\"    Imaging  The most recent ultrasound was performed on 25  with a study GA of  19.3 weeks  and EFW of  42%tile. .          Assessment/Plan     Continue prenatal vitamin.  Labs reviewed.  Elevated 1 hour, normal 3-hour Glucola.  Expected mode of delivery  section  Follow up in 2 weeks for a routine prenatal visit.  "

## 2025-05-14 ENCOUNTER — APPOINTMENT (OUTPATIENT)
Dept: OBSTETRICS AND GYNECOLOGY | Facility: CLINIC | Age: 40
End: 2025-05-14
Payer: COMMERCIAL

## 2025-05-21 ENCOUNTER — APPOINTMENT (OUTPATIENT)
Dept: OBSTETRICS AND GYNECOLOGY | Facility: CLINIC | Age: 40
End: 2025-05-21
Payer: COMMERCIAL

## 2025-05-21 VITALS — DIASTOLIC BLOOD PRESSURE: 68 MMHG | WEIGHT: 173 LBS | BODY MASS INDEX: 30.65 KG/M2 | SYSTOLIC BLOOD PRESSURE: 118 MMHG

## 2025-05-21 DIAGNOSIS — F32.A ANXIETY AND DEPRESSION: ICD-10-CM

## 2025-05-21 DIAGNOSIS — O34.219 PREVIOUS CESAREAN SECTION COMPLICATING PREGNANCY (HHS-HCC): ICD-10-CM

## 2025-05-21 DIAGNOSIS — O21.9 NAUSEA AND VOMITING IN PREGNANCY PRIOR TO 22 WEEKS GESTATION: ICD-10-CM

## 2025-05-21 DIAGNOSIS — F41.9 ANXIETY AND DEPRESSION: ICD-10-CM

## 2025-05-21 DIAGNOSIS — Z34.93 THIRD TRIMESTER PREGNANCY (HHS-HCC): Primary | ICD-10-CM

## 2025-05-21 DIAGNOSIS — G43.709 CHRONIC MIGRAINE WITHOUT AURA WITHOUT STATUS MIGRAINOSUS, NOT INTRACTABLE: ICD-10-CM

## 2025-05-21 DIAGNOSIS — O09.521 MULTIGRAVIDA OF ADVANCED MATERNAL AGE IN FIRST TRIMESTER (HHS-HCC): ICD-10-CM

## 2025-05-21 DIAGNOSIS — Z98.890 H/O LEEP: ICD-10-CM

## 2025-05-21 DIAGNOSIS — O99.810 ABNORMAL GLUCOSE TOLERANCE AFFECTING PREGNANCY, ANTEPARTUM (HHS-HCC): ICD-10-CM

## 2025-05-21 DIAGNOSIS — Z3A.32 32 WEEKS GESTATION OF PREGNANCY (HHS-HCC): ICD-10-CM

## 2025-05-21 DIAGNOSIS — Z29.13 NEED FOR RHOGAM DUE TO RH NEGATIVE MOTHER: ICD-10-CM

## 2025-05-21 PROCEDURE — 0501F PRENATAL FLOW SHEET: CPT | Performed by: OBSTETRICS & GYNECOLOGY

## 2025-05-21 NOTE — PROGRESS NOTES
Subjective   Patient ID [id number]   Linda Bhardwaj is a 39 y.o.  at 32w3d with a working estimated date of delivery of 2025, by Last Menstrual Period who presents for a routine prenatal visit. She denies vaginal bleeding, leakage of fluid, decreased fetal movements, or contractions.    Her pregnancy is complicated by:  AMA, normal genetics.  History of migraines depression, on Effexor 150 mg daily.    History of LEEP,  Pap from May 2023 normal.    Rh-, current with RhoGAM.  Planning repeat  #2 with salpingectomies.  Elevated 1 hour Glucola, normal 3-hour Glucola.  Problem List Items Addressed This Visit       Chronic migraine without aura without status migrainosus, not intractable    Overview   On Effexor 150 mg daily for migraines/depression.         Multigravida of advanced maternal age in first trimester ([facility])    Overview   39 years old at [facility], ref to Genetics.         Previous  section complicating pregnancy ([facility])    Overview    section 2019 for breech, in labor, girl.  S/p elective repeat  section 2021 girl.  Plan repeat  #3 with salpingectomy in 2025.         H/O LEEP    Overview   H/O LEEP biopsy 11/10/22 with MAYCOL 2-3 ([facility]), pap's normal since. (23).         Need for rhogam due to Rh negative mother    Overview   O negative.         Nausea and vomiting in pregnancy prior to 22 weeks gestation    Abnormal glucose tolerance affecting pregnancy, antepartum ([facility])    Overview   Elevated 1 hr gtt (152).  3-hour Glucola normal (78/139/64/71)         Anxiety and depression    Overview   Mild depression, history of migraines, on Effexor 150 mg daily.          Other Visit Diagnoses         Third trimester pregnancy ([facility])    -  Primary      32 weeks gestation of pregnancy ([facility])               Objective   Physical Exam:   Weight: 78.5 kg (173 lb)  Expected Total Weight Gain: Could not be calculated   Pregravid BMI: Could  "not be calculated  BP: 118/68  Fetal Heart Rate: 152 Fundal Height (cm): 33 cm             Prenatal Labs  Urine Dip:  Lab Results   Component Value Date    KETONESU NEGATIVE 07/02/2024     Lab Results   Component Value Date    HGB 11.4 (L) 04/16/2025    HCT 34.4 (L) 04/16/2025    ABO O 04/16/2025    HEPBSAG Nonreactive 01/07/2025     No results found for: \"PAPPA\", \"AFP\", \"HCG\", \"ESTRIOL\", \"INHBA\"  No results found for: \"GLUF\", \"GLUT1\", \"EFMNQKB8BH\", \"NSIZPOY0RH\"    Imaging  The most recent ultrasound was performed on 2/19/25  with a study GA of    19.3 weeks and EFW of 42%tile.  .          Assessment/Plan     Continue prenatal vitamin.  Labs reviewed.  Expected mode of delivery  repeat c/s #3 with BS 7/10/25.  Follow up in 1 week for a routine prenatal visit.  "

## 2025-06-04 ENCOUNTER — APPOINTMENT (OUTPATIENT)
Dept: OBSTETRICS AND GYNECOLOGY | Facility: CLINIC | Age: 40
End: 2025-06-04
Payer: COMMERCIAL

## 2025-06-04 VITALS — WEIGHT: 175 LBS | BODY MASS INDEX: 31 KG/M2 | DIASTOLIC BLOOD PRESSURE: 66 MMHG | SYSTOLIC BLOOD PRESSURE: 120 MMHG

## 2025-06-04 DIAGNOSIS — F32.A ANXIETY AND DEPRESSION: ICD-10-CM

## 2025-06-04 DIAGNOSIS — F41.9 ANXIETY AND DEPRESSION: ICD-10-CM

## 2025-06-04 DIAGNOSIS — Z98.890 H/O LEEP: ICD-10-CM

## 2025-06-04 DIAGNOSIS — G43.709 CHRONIC MIGRAINE WITHOUT AURA WITHOUT STATUS MIGRAINOSUS, NOT INTRACTABLE: ICD-10-CM

## 2025-06-04 DIAGNOSIS — O99.810 ABNORMAL GLUCOSE TOLERANCE AFFECTING PREGNANCY, ANTEPARTUM (HHS-HCC): ICD-10-CM

## 2025-06-04 DIAGNOSIS — Z34.93 THIRD TRIMESTER PREGNANCY (HHS-HCC): Primary | ICD-10-CM

## 2025-06-04 DIAGNOSIS — O34.219 PREVIOUS CESAREAN SECTION COMPLICATING PREGNANCY (HHS-HCC): ICD-10-CM

## 2025-06-04 DIAGNOSIS — Z3A.34 34 WEEKS GESTATION OF PREGNANCY (HHS-HCC): ICD-10-CM

## 2025-06-04 DIAGNOSIS — Z29.13 NEED FOR RHOGAM DUE TO RH NEGATIVE MOTHER: ICD-10-CM

## 2025-06-04 PROCEDURE — 0501F PRENATAL FLOW SHEET: CPT | Performed by: OBSTETRICS & GYNECOLOGY

## 2025-06-04 NOTE — PROGRESS NOTES
Subjective   Patient ID [id number]   Linda Bhardwaj is a 39 y.o.  at 34w3d with a working estimated date of delivery of 2025, by Last Menstrual Period who presents for a routine prenatal visit. She denies vaginal bleeding, leakage of fluid, decreased fetal movements, or contractions.    Her pregnancy is complicated by:  Previous  section x 2.  History of LEEP biopsy.  AMA.  Rh-.  Anxiety depression, on Effexor 150 mg daily.  Problem List Items Addressed This Visit       Chronic migraine without aura without status migrainosus, not intractable    Overview   On Effexor 150 mg daily for migraines/depression.         Previous  section complicating pregnancy ([facility])    Overview    section 2019 for breech, in labor, girl.  S/p elective repeat  section 2021 girl.  Plan repeat  #3 with salpingectomy in 2025.         H/O LEEP    Overview   H/O LEEP biopsy 11/10/22 with MAYCOL 2-3 ([facility]), pap's normal since. (23).         Need for rhogam due to Rh negative mother    Overview   O negative.         Abnormal glucose tolerance affecting pregnancy, antepartum ([facility])    Overview   Elevated 1 hr gtt (152).  3-hour Glucola normal (78/139/64/71)         Relevant Orders    US MAC OB imaging order    Anxiety and depression    Overview   Mild depression, history of migraines, on Effexor 150 mg daily.         34 weeks gestation of pregnancy ([facility])    Overview   Desired provider in labor: [] CNM  [x] Physician Alisson  [] Blood Products: [] Yes, accepts [] No, needs counseling  [x] Initial BMI: Could not be calculated   [x] Prenatal Labs:   [x] Cervical Cancer Screening up to date (pap 23)  [x] Rh status: O neg  [x] Genetic Screening:  ref to Genetics for AMA.  [x] NT US: (11-13 wks) normal NT 25 at 13.4 weeks.  [x] Baby ASA (if indicated): taking  [x] Pregnancy dated by: LMP     [x] Anatomy US: (19-20 wks)  [] Federal Sterilization consent  "signed (if indicated):  [x] 1hr GCT at 24-28wks: 152, 3 hr gtt= 78/139/64/71  [x] Rhogam (if indicated): given 25.  [] Fetal Surveillance (if indicated):  [x] Tdap (27-32 wks, may be given up to 36 wks if initial window missed): discussed 25.  [] RSV (32-36 wks) (Sept. to end ):   [] Flu Vaccine:     [] Breastfeeding:  [] Postpartum Birth control method:   [] GBS at 36 - 37 wks:  [] 39 weeks discussion of IOL vs. Expectant management:   Mode of delivery ( anticipated ): Repeat  section #3 at [facility].          Other Visit Diagnoses         Third trimester pregnancy ([facility])    -  Primary    Relevant Orders    US MAC OB imaging order           Objective   Physical Exam:   Weight: 79.4 kg (175 lb)  Expected Total Weight Gain: Could not be calculated   Pregravid BMI: Could not be calculated  BP: 120/66                  Prenatal Labs  Urine Dip:  Lab Results   Component Value Date    KETONESU NEGATIVE 2024     Lab Results   Component Value Date    HGB 11.4 (L) 2025    HCT 34.4 (L) 2025    ABO O 2025    HEPBSAG Nonreactive 2025     No results found for: \"PAPPA\", \"AFP\", \"HCG\", \"ESTRIOL\", \"INHBA\"  No results found for: \"GLUF\", \"GLUT1\", \"GHALTJC0OO\", \"BMUEYCQ7CJ\"    Imaging  The most recent ultrasound was performed on  most recent ultrasound study is not finalized with a study GA of  19.3 weeksThe most recent ultrasound study is not finalized and EFW of  42%tile.  The most recent ultrasound study is not finalized.  The most recent ultrasound study is not finalized  The most recent ultrasound study is not finalized    Assessment/Plan     Continue prenatal vitamin.  Labs reviewed.  GBS next visit.  Expected mode of delivery repeat c/s with BS.  Follow up in 2 weeks for a routine prenatal visit.  "

## 2025-06-18 ENCOUNTER — APPOINTMENT (OUTPATIENT)
Dept: OBSTETRICS AND GYNECOLOGY | Facility: CLINIC | Age: 40
End: 2025-06-18
Payer: COMMERCIAL

## 2025-06-18 VITALS — DIASTOLIC BLOOD PRESSURE: 74 MMHG | WEIGHT: 178 LBS | BODY MASS INDEX: 31.53 KG/M2 | SYSTOLIC BLOOD PRESSURE: 120 MMHG

## 2025-06-18 DIAGNOSIS — O21.9 NAUSEA AND VOMITING IN PREGNANCY PRIOR TO 22 WEEKS GESTATION: ICD-10-CM

## 2025-06-18 DIAGNOSIS — O09.521 MULTIGRAVIDA OF ADVANCED MATERNAL AGE IN FIRST TRIMESTER (HHS-HCC): ICD-10-CM

## 2025-06-18 DIAGNOSIS — Z98.890 H/O LEEP: ICD-10-CM

## 2025-06-18 DIAGNOSIS — Z3A.36 36 WEEKS GESTATION OF PREGNANCY (HHS-HCC): ICD-10-CM

## 2025-06-18 DIAGNOSIS — Z29.13 NEED FOR RHOGAM DUE TO RH NEGATIVE MOTHER: ICD-10-CM

## 2025-06-18 DIAGNOSIS — O34.219 PREVIOUS CESAREAN SECTION COMPLICATING PREGNANCY (HHS-HCC): ICD-10-CM

## 2025-06-18 DIAGNOSIS — Z34.93 THIRD TRIMESTER PREGNANCY (HHS-HCC): Primary | ICD-10-CM

## 2025-06-18 DIAGNOSIS — O99.810 ABNORMAL GLUCOSE TOLERANCE AFFECTING PREGNANCY, ANTEPARTUM (HHS-HCC): ICD-10-CM

## 2025-06-18 PROCEDURE — 0501F PRENATAL FLOW SHEET: CPT | Performed by: OBSTETRICS & GYNECOLOGY

## 2025-06-18 NOTE — PROGRESS NOTES
Subjective   Patient ID [id number]   Linda Bhardwaj is a 39 y.o.  at 36w3d with a working estimated date of delivery of 2025, by Last Menstrual Period who presents for a routine prenatal visit. She denies vaginal bleeding, leakage of fluid, decreased fetal movements, or contractions.    Her pregnancy is complicated by:  AMA, genetics normal girl.  Anxiety depression taking Effexor 150 mg daily.  History of LEEP.    Rh-, RhoGAM given.    2 prior  sections, plan repeat  with bilateral salpingectomies July 10.    Objective   Physical Exam: cl/50/-3. vtx  Weight: 80.7 kg (178 lb)  Expected Total Weight Gain: Could not be calculated   Pregravid BMI: Could not be calculated  BP: 120/74                Problem List Items Addressed This Visit       Multigravida of advanced maternal age in first trimester ([facility])    Overview   39 years old at [facility], ref to Genetics.         Previous  section complicating pregnancy ([facility])    Overview    section 2019 for breech, in labor, girl.  S/p elective repeat  section 2021 girl.  Plan repeat  #3 with salpingectomy in 2025.         H/O LEEP    Overview   H/O LEEP biopsy 11/10/22 with MAYCOL 2-3 ([facility]), pap's normal since. (23).         Need for rhogam due to Rh negative mother    Overview   O negative.         Nausea and vomiting in pregnancy prior to 22 weeks gestation    Abnormal glucose tolerance affecting pregnancy, antepartum ([facility])    Overview   Elevated 1 hr gtt (152).  3-hour Glucola normal (78/139/64/71)         36 weeks gestation of pregnancy ([facility])    Overview   Desired provider in labor: [] CNM  [x] Physician Alisson  [] Blood Products: [] Yes, accepts [] No, needs counseling  [x] Initial BMI: Could not be calculated   [x] Prenatal Labs:   [x] Cervical Cancer Screening up to date (pap 23)  [x] Rh status: O neg  [x] Genetic Screening:  ref to Genetics for AMA.  [x] NT US: (  "wks) normal NT 25 at 13.4 weeks.  [x] Baby ASA (if indicated): taking  [x] Pregnancy dated by: LMP     [x] Anatomy US: (19-20 wks)  [] Federal Sterilization consent signed (if indicated):  [x] 1hr GCT at 24-28wks: 152, 3 hr gtt= 78/139/64/71  [x] Rhogam (if indicated): given 25.  [] Fetal Surveillance (if indicated):  [x] Tdap (27-32 wks, may be given up to 36 wks if initial window missed): discussed 25.  [] RSV (32-36 wks) (Sept. to end ):   [] Flu Vaccine:     [] Breastfeeding:  [x] Postpartum Birth control method: Planning bilateral salpingectomies at repeat .  [x] GBS at 36 - 37 wks: sent 25  [] 39 weeks discussion of IOL vs. Expectant management:   Mode of delivery ( anticipated ): Repeat  section #3 at [facility].          Other Visit Diagnoses         Third trimester pregnancy ([facility])    -  Primary    Relevant Orders    Group B Streptococcus (GBS) Prenatal Screen, Culture           Prenatal Labs  Urine Dip:  Lab Results   Component Value Date    KETONESU NEGATIVE 2024     Lab Results   Component Value Date    HGB 11.4 (L) 2025    HCT 34.4 (L) 2025    ABO O 2025    HEPBSAG Nonreactive 2025     No results found for: \"PAPPA\", \"AFP\", \"HCG\", \"ESTRIOL\", \"INHBA\"  No results found for: \"GLUF\", \"GLUT1\", \"ITCYBTZ6XO\", \"XNNQDHU2VC\"    Imaging  The most recent ultrasound was performed on  most recent ultrasound study is not finalized with a study GA of  19.3 weeksThe most recent ultrasound study is not finalized and EFW of 42%tile.   The most recent ultrasound study is not finalized.  The most recent ultrasound study is not finalized  The most recent ultrasound study is not finalized    Assessment/Plan     Continue prenatal vitamin.  Labs reviewed.  GBS taken today.  Expected mode of delivery Repeat c/s with BS on 7/10/25.  Follow up in 1 week for a routine prenatal visit.  "

## 2025-06-22 LAB — GP B STREP SPEC QL CULT: ABNORMAL

## 2025-06-25 ENCOUNTER — APPOINTMENT (OUTPATIENT)
Dept: OBSTETRICS AND GYNECOLOGY | Facility: CLINIC | Age: 40
End: 2025-06-25
Payer: COMMERCIAL

## 2025-06-26 ENCOUNTER — ROUTINE PRENATAL (OUTPATIENT)
Facility: CLINIC | Age: 40
End: 2025-06-26
Payer: COMMERCIAL

## 2025-06-26 ENCOUNTER — HOSPITAL ENCOUNTER (OUTPATIENT)
Dept: RADIOLOGY | Facility: CLINIC | Age: 40
Discharge: HOME | End: 2025-06-26
Payer: COMMERCIAL

## 2025-06-26 VITALS — DIASTOLIC BLOOD PRESSURE: 83 MMHG | SYSTOLIC BLOOD PRESSURE: 133 MMHG | WEIGHT: 179 LBS | BODY MASS INDEX: 31.71 KG/M2

## 2025-06-26 DIAGNOSIS — O09.521 MULTIGRAVIDA OF ADVANCED MATERNAL AGE IN FIRST TRIMESTER (HHS-HCC): ICD-10-CM

## 2025-06-26 DIAGNOSIS — O21.9 NAUSEA AND VOMITING IN PREGNANCY PRIOR TO 22 WEEKS GESTATION: ICD-10-CM

## 2025-06-26 DIAGNOSIS — F32.A ANXIETY AND DEPRESSION: ICD-10-CM

## 2025-06-26 DIAGNOSIS — O34.219 PREVIOUS CESAREAN SECTION COMPLICATING PREGNANCY (HHS-HCC): ICD-10-CM

## 2025-06-26 DIAGNOSIS — F41.9 ANXIETY AND DEPRESSION: ICD-10-CM

## 2025-06-26 DIAGNOSIS — Z3A.37 37 WEEKS GESTATION OF PREGNANCY (HHS-HCC): ICD-10-CM

## 2025-06-26 DIAGNOSIS — Z98.890 H/O LEEP: ICD-10-CM

## 2025-06-26 DIAGNOSIS — Z29.13 NEED FOR RHOGAM DUE TO RH NEGATIVE MOTHER: ICD-10-CM

## 2025-06-26 DIAGNOSIS — O99.810 ABNORMAL GLUCOSE TOLERANCE AFFECTING PREGNANCY, ANTEPARTUM (HHS-HCC): ICD-10-CM

## 2025-06-26 DIAGNOSIS — Z34.93 THIRD TRIMESTER PREGNANCY (HHS-HCC): ICD-10-CM

## 2025-06-26 DIAGNOSIS — G43.709 CHRONIC MIGRAINE WITHOUT AURA WITHOUT STATUS MIGRAINOSUS, NOT INTRACTABLE: ICD-10-CM

## 2025-06-26 DIAGNOSIS — Z34.93 THIRD TRIMESTER PREGNANCY (HHS-HCC): Primary | ICD-10-CM

## 2025-06-26 PROCEDURE — 76816 OB US FOLLOW-UP PER FETUS: CPT

## 2025-06-26 PROCEDURE — 0501F PRENATAL FLOW SHEET: CPT | Performed by: OBSTETRICS & GYNECOLOGY

## 2025-06-26 PROCEDURE — 76819 FETAL BIOPHYS PROFIL W/O NST: CPT

## 2025-06-26 NOTE — PROGRESS NOTES
Subjective   Patient ID [id number]   Linda Bhardwaj is a 39 y.o.  at 37w4d with a working estimated date of delivery of 2025, by Last Menstrual Period who presents for a routine prenatal visit. She denies vaginal bleeding, leakage of fluid, decreased fetal movements, or contractions.  No change in intermittent headaches.  Trace pedal edema.  Negative proteinuria.  No vision changes, no right quadrant pain.    Her pregnancy is complicated by:  AMA, normal girl and genetics.  History of depression, Effexor 150 mg daily.    History of LEEP , last Pap normal in May 2023.    Rh-, current on RhoGAM.    History of previous C-sections x 2, plan repeat  #3 with salpingectomies on July 10.  Problem List Items Addressed This Visit       Chronic migraine without aura without status migrainosus, not intractable    Overview   On Effexor 150 mg daily for migraines/depression.         Multigravida of advanced maternal age in first trimester ([facility])    Overview   39 years old at [facility], ref to Genetics.         Previous  section complicating pregnancy ([facility])    Overview    section 2019 for breech, in labor, girl.  S/p elective repeat  section 2021 girl.  Plan repeat  #3 with salpingectomy in 2025.         H/O LEEP    Overview   H/O LEEP biopsy 11/10/22 with MAYCOL 2-3 ([facility]), pap's normal since. (23).         Need for rhogam due to Rh negative mother    Overview   O negative.         Nausea and vomiting in pregnancy prior to 22 weeks gestation    Abnormal glucose tolerance affecting pregnancy, antepartum ([facility])    Overview   Elevated 1 hr gtt (152).  3-hour Glucola normal (78/139/64/71)         Anxiety and depression    Overview   Mild depression, history of migraines, on Effexor 150 mg daily.         37 weeks gestation of pregnancy ([facility])    Overview   Desired provider in labor: [] CNM  [x] Physician Alisson  [] Blood Products: [] Yes,  "accepts [] No, needs counseling  [x] Initial BMI: Could not be calculated   [x] Prenatal Labs:   [x] Cervical Cancer Screening up to date (pap 23)  [x] Rh status: O neg  [x] Genetic Screening:  ref to Genetics for AMA.  [x] NT US: (11-13 wks) normal NT 25 at 13.4 weeks.  [x] Baby ASA (if indicated): taking  [x] Pregnancy dated by: LMP     [x] Anatomy US: (19-20 wks)  [] Federal Sterilization consent signed (if indicated):  [x] 1hr GCT at 24-28wks: 152, 3 hr gtt= 78/139/64/71  [x] Rhogam (if indicated): given 25.  [] Fetal Surveillance (if indicated):  [x] Tdap (27-32 wks, may be given up to 36 wks if initial window missed): discussed 25.  [] RSV (32-36 wks) (Sept. to end of ):   [] Flu Vaccine:     [] Breastfeeding:  [x] Postpartum Birth control method: Planning bilateral salpingectomies at repeat .  [x] GBS at 36 - 37 wks: sent 25, GBS positive.  [] 39 weeks discussion of IOL vs. Expectant management:   Mode of delivery ( anticipated ): Repeat  section #3 at [facility].          Other Visit Diagnoses         Third trimester pregnancy ([facility])    -  Primary           Objective   Physical Exam: closed/50/-3  Weight: 81.2 kg (179 lb)  Expected Total Weight Gain: Could not be calculated   Pregravid BMI: Could not be calculated  BP: 133/83                  Prenatal Labs  Urine Dip:  Lab Results   Component Value Date    KETONESU NEGATIVE 2024     Lab Results   Component Value Date    HGB 11.4 (L) 2025    HCT 34.4 (L) 2025    ABO O 2025    HEPBSAG Nonreactive 2025     No results found for: \"PAPPA\", \"AFP\", \"HCG\", \"ESTRIOL\", \"INHBA\"  No results found for: \"GLUF\", \"GLUT1\", \"KNDHPCV1XG\", \"NVIIMLI1WH\"    Imaging  The most recent ultrasound was performed on 25 The most recent ultrasound study is not finalized with a study GA of  19.3 weeksThe most recent ultrasound study is not finalized and EFW of  42%tile.The most recent ultrasound study is not " finalized.  The most recent ultrasound study is not finalized  The most recent ultrasound study is not finalized    Assessment/Plan     Continue prenatal vitamin.  Labs reviewed.  Has trimester ultrasound today.  GBS positive..  Expected mode of delivery repeat  section with BS 7/10/25.  Follow up in 1 week for a routine prenatal visit.

## 2025-07-02 ENCOUNTER — APPOINTMENT (OUTPATIENT)
Dept: OBSTETRICS AND GYNECOLOGY | Facility: CLINIC | Age: 40
End: 2025-07-02
Payer: COMMERCIAL

## 2025-07-02 VITALS — DIASTOLIC BLOOD PRESSURE: 80 MMHG | BODY MASS INDEX: 31.89 KG/M2 | WEIGHT: 180 LBS | SYSTOLIC BLOOD PRESSURE: 122 MMHG

## 2025-07-02 DIAGNOSIS — O21.9 NAUSEA AND VOMITING IN PREGNANCY PRIOR TO 22 WEEKS GESTATION: ICD-10-CM

## 2025-07-02 DIAGNOSIS — O34.219 PREVIOUS CESAREAN SECTION COMPLICATING PREGNANCY (HHS-HCC): ICD-10-CM

## 2025-07-02 DIAGNOSIS — O99.810 ABNORMAL GLUCOSE TOLERANCE AFFECTING PREGNANCY, ANTEPARTUM (HHS-HCC): ICD-10-CM

## 2025-07-02 DIAGNOSIS — F32.A ANXIETY AND DEPRESSION: ICD-10-CM

## 2025-07-02 DIAGNOSIS — O09.521 MULTIGRAVIDA OF ADVANCED MATERNAL AGE IN FIRST TRIMESTER (HHS-HCC): ICD-10-CM

## 2025-07-02 DIAGNOSIS — Z34.93 THIRD TRIMESTER PREGNANCY (HHS-HCC): Primary | ICD-10-CM

## 2025-07-02 DIAGNOSIS — G43.709 CHRONIC MIGRAINE WITHOUT AURA WITHOUT STATUS MIGRAINOSUS, NOT INTRACTABLE: ICD-10-CM

## 2025-07-02 DIAGNOSIS — Z3A.38 38 WEEKS GESTATION OF PREGNANCY (HHS-HCC): ICD-10-CM

## 2025-07-02 DIAGNOSIS — Z29.13 NEED FOR RHOGAM DUE TO RH NEGATIVE MOTHER: ICD-10-CM

## 2025-07-02 DIAGNOSIS — Z98.890 H/O LEEP: ICD-10-CM

## 2025-07-02 DIAGNOSIS — F41.9 ANXIETY AND DEPRESSION: ICD-10-CM

## 2025-07-02 PROCEDURE — 0501F PRENATAL FLOW SHEET: CPT | Performed by: OBSTETRICS & GYNECOLOGY

## 2025-07-02 NOTE — PROGRESS NOTES
Subjective   Patient ID [id number]   Linda Bhardwaj is a 39 y.o.  at 38w3d with a working estimated date of delivery of 2025, by Last Menstrual Period who presents for a routine prenatal visit. She denies vaginal bleeding, leakage of fluid, decreased fetal movements, or contractions.    Her pregnancy is complicated by:  Previous 2  sections.  This current pregnancy is 72 percentile, with mild polyhydramnios in the setting of elevated 1 hour Glucola, normal 3-hour Glucola.  Planning repeat section 7/10/2025.    Objective   Physical Exam: closed/50/-3  Weight: 81.6 kg (180 lb)  Expected Total Weight Gain: Could not be calculated   Pregravid BMI: Could not be calculated  BP: 122/80                Problem List Items Addressed This Visit       Chronic migraine without aura without status migrainosus, not intractable    Overview   On Effexor 150 mg daily for migraines/depression.         Multigravida of advanced maternal age in first trimester ([facility])    Overview   39 years old at [facility], ref to Genetics.         Previous  section complicating pregnancy ([facility])    Overview    section 2019 for breech, in labor, girl.  S/p elective repeat  section 2021 girl.  Plan repeat  #3 with salpingectomy in 2025.         Relevant Orders    CBC Anemia Panel With Reflex, Pregnancy    Comprehensive Metabolic Panel    Type And Screen Is this order related to pregnancy or an upcoming surgery? Yes; Where will this surgery/delivery be performed? [facility]; What is the date of the surgery? 7/10/2025 (14:00 pm); Has this patient ever had a transfusion...    Syphilis Screen with Reflex    H/O LEEP    Overview   H/O LEEP biopsy 11/10/22 with MAYCOL 2-3 ([facility]GSIL), pap's normal since. (23).         Need for rhogam due to Rh negative mother    Overview   O negative.         Nausea and vomiting in pregnancy prior to 22 weeks gestation    Abnormal glucose  tolerance affecting pregnancy, antepartum ([facility])    Overview   Elevated 1 hr gtt (152).  3-hour Glucola normal (78/139/64/71)         Anxiety and depression    Overview   Mild depression, history of migraines, on Effexor 150 mg daily.         38 weeks gestation of pregnancy ([facility])    Overview   Desired provider in labor: [] CNM  [x] Physician Alisson  [] Blood Products: [] Yes, accepts [] No, needs counseling  [x] Initial BMI: Could not be calculated   [x] Prenatal Labs:   [x] Cervical Cancer Screening up to date (pap 23)  [x] Rh status: O neg  [x] Genetic Screening:  ref to Genetics for AMA.  [x] NT US: (11-13 wks) normal NT 25 at 13.4 weeks.  [x] Baby ASA (if indicated): taking  [x] Pregnancy dated by: LMP     [x] Anatomy US: (19-20 wks)  [] Federal Sterilization consent signed (if indicated):  [x] 1hr GCT at 24-28wks: 152, 3 hr gtt= 78/139/64/71  [x] Rhogam (if indicated): given 25.  [] Fetal Surveillance (if indicated):  [x] Tdap (27-32 wks, may be given up to 36 wks if initial window missed): discussed 25.  [] RSV (32-36 wks) (Sept. to end of ):   [] Flu Vaccine:     [] Breastfeeding:  [x] Postpartum Birth control method: Planning bilateral salpingectomies at repeat .  [x] GBS at 36 - 37 wks: sent 25, GBS positive.  [] 39 weeks discussion of IOL vs. Expectant management:   Mode of delivery ( anticipated ): Repeat  section #3 at [facility].          Other Visit Diagnoses         Third trimester pregnancy ([facility])    -  Primary    Relevant Orders    CBC Anemia Panel With Reflex, Pregnancy    Comprehensive Metabolic Panel    Type And Screen Is this order related to pregnancy or an upcoming surgery? Yes; Where will this surgery/delivery be performed? [facility]; What is the date of the surgery? 7/10/2025 (14:00 pm); Has this patient ever had a transfusion...    Syphilis Screen with Reflex           Prenatal Labs  Urine Dip:  Lab Results   Component  "Value Date    KETONESU NEGATIVE 07/02/2024     Lab Results   Component Value Date    HGB 11.4 (L) 04/16/2025    HCT 34.4 (L) 04/16/2025    ABO O 04/16/2025    HEPBSAG Nonreactive 01/07/2025     No results found for: \"PAPPA\", \"AFP\", \"HCG\", \"ESTRIOL\", \"INHBA\"  No results found for: \"GLUF\", \"GLUT1\", \"BWRVREJ1NM\", \"PUEYSJC3LT\"    Imaging  The most recent ultrasound was performed on 6/26/25  with a study GA of  37.0 weeks  and EFW of  72%tile with  mild polyhydramnios.  Anterior placenta. .          Assessment/Plan     Continue prenatal vitamin.  Labs reviewed.  GBS positive..  Expected mode of delivery repeat c/s with BS 7/10/25.  Follow up in 1 week for a routine prenatal visit.  "

## 2025-07-08 ENCOUNTER — ANESTHESIA EVENT (OUTPATIENT)
Dept: OBSTETRICS AND GYNECOLOGY | Facility: HOSPITAL | Age: 40
End: 2025-07-08
Payer: COMMERCIAL

## 2025-07-08 ENCOUNTER — HOSPITAL ENCOUNTER (OUTPATIENT)
Facility: HOSPITAL | Age: 40
Setting detail: SURGERY ADMIT
End: 2025-07-08
Attending: OBSTETRICS & GYNECOLOGY | Admitting: OBSTETRICS & GYNECOLOGY
Payer: COMMERCIAL

## 2025-07-08 ENCOUNTER — HOSPITAL ENCOUNTER (INPATIENT)
Facility: HOSPITAL | Age: 40
LOS: 3 days | Discharge: HOME | End: 2025-07-11
Attending: OBSTETRICS & GYNECOLOGY | Admitting: OBSTETRICS & GYNECOLOGY
Payer: COMMERCIAL

## 2025-07-08 ENCOUNTER — ANESTHESIA (OUTPATIENT)
Dept: OBSTETRICS AND GYNECOLOGY | Facility: HOSPITAL | Age: 40
End: 2025-07-08
Payer: COMMERCIAL

## 2025-07-08 DIAGNOSIS — Z30.2 REQUEST FOR STERILIZATION: Primary | ICD-10-CM

## 2025-07-08 DIAGNOSIS — Z98.891 STATUS POST REPEAT LOW TRANSVERSE CESAREAN SECTION: ICD-10-CM

## 2025-07-08 PROBLEM — Z37.9 NORMAL LABOR (HHS-HCC): Status: ACTIVE | Noted: 2025-07-08

## 2025-07-08 LAB
ABO GROUP (TYPE) IN BLOOD: NORMAL
ALBUMIN SERPL BCP-MCNC: 3.6 G/DL (ref 3.4–5)
ALP SERPL-CCNC: 141 U/L (ref 33–110)
ALT SERPL W P-5'-P-CCNC: 14 U/L (ref 7–45)
ANION GAP SERPL CALC-SCNC: 17 MMOL/L (ref 10–20)
ANTIBODY SCREEN: NORMAL
AST SERPL W P-5'-P-CCNC: 25 U/L (ref 9–39)
BILIRUB SERPL-MCNC: 0.4 MG/DL (ref 0–1.2)
BUN SERPL-MCNC: 6 MG/DL (ref 6–23)
CALCIUM SERPL-MCNC: 9 MG/DL (ref 8.6–10.6)
CHLORIDE SERPL-SCNC: 102 MMOL/L (ref 98–107)
CO2 SERPL-SCNC: 20 MMOL/L (ref 21–32)
CREAT SERPL-MCNC: 0.58 MG/DL (ref 0.5–1.05)
EGFRCR SERPLBLD CKD-EPI 2021: >90 ML/MIN/1.73M*2
ERYTHROCYTE [DISTWIDTH] IN BLOOD BY AUTOMATED COUNT: 13.3 % (ref 11.5–14.5)
GLUCOSE SERPL-MCNC: 87 MG/DL (ref 74–99)
HCT VFR BLD AUTO: 37.5 % (ref 36–46)
HGB BLD-MCNC: 12.3 G/DL (ref 12–16)
MCH RBC QN AUTO: 30.2 PG (ref 26–34)
MCHC RBC AUTO-ENTMCNC: 32.8 G/DL (ref 32–36)
MCV RBC AUTO: 92 FL (ref 80–100)
NRBC BLD-RTO: 0 /100 WBCS (ref 0–0)
PLATELET # BLD AUTO: 157 X10*3/UL (ref 150–450)
POTASSIUM SERPL-SCNC: 3.9 MMOL/L (ref 3.5–5.3)
PROT SERPL-MCNC: 6.3 G/DL (ref 6.4–8.2)
RBC # BLD AUTO: 4.07 X10*6/UL (ref 4–5.2)
RH FACTOR (ANTIGEN D): NORMAL
SODIUM SERPL-SCNC: 135 MMOL/L (ref 136–145)
TREPONEMA PALLIDUM IGG+IGM AB [PRESENCE] IN SERUM OR PLASMA BY IMMUNOASSAY: NONREACTIVE
WBC # BLD AUTO: 11.2 X10*3/UL (ref 4.4–11.3)

## 2025-07-08 PROCEDURE — 59510 CESAREAN DELIVERY: CPT | Performed by: OBSTETRICS & GYNECOLOGY

## 2025-07-08 PROCEDURE — 59514 CESAREAN DELIVERY ONLY: CPT | Performed by: OBSTETRICS & GYNECOLOGY

## 2025-07-08 PROCEDURE — 7100000016 HC LABOR RECOVERY PER HOUR: Performed by: OBSTETRICS & GYNECOLOGY

## 2025-07-08 PROCEDURE — 86850 RBC ANTIBODY SCREEN: CPT

## 2025-07-08 PROCEDURE — 80053 COMPREHEN METABOLIC PANEL: CPT

## 2025-07-08 PROCEDURE — 2500000001 HC RX 250 WO HCPCS SELF ADMINISTERED DRUGS (ALT 637 FOR MEDICARE OP): Performed by: NURSE ANESTHETIST, CERTIFIED REGISTERED

## 2025-07-08 PROCEDURE — 86780 TREPONEMA PALLIDUM: CPT

## 2025-07-08 PROCEDURE — 2500000004 HC RX 250 GENERAL PHARMACY W/ HCPCS (ALT 636 FOR OP/ED): Mod: JZ,TB

## 2025-07-08 PROCEDURE — 36415 COLL VENOUS BLD VENIPUNCTURE: CPT

## 2025-07-08 PROCEDURE — 01961 ANES CESAREAN DELIVERY ONLY: CPT | Performed by: NURSE ANESTHETIST, CERTIFIED REGISTERED

## 2025-07-08 PROCEDURE — 85027 COMPLETE CBC AUTOMATED: CPT

## 2025-07-08 PROCEDURE — 01961 ANES CESAREAN DELIVERY ONLY: CPT | Performed by: STUDENT IN AN ORGANIZED HEALTH CARE EDUCATION/TRAINING PROGRAM

## 2025-07-08 PROCEDURE — 86922 COMPATIBILITY TEST ANTIGLOB: CPT

## 2025-07-08 PROCEDURE — 58611 LIGATE OVIDUCT(S) ADD-ON: CPT | Performed by: OBSTETRICS & GYNECOLOGY

## 2025-07-08 PROCEDURE — 2500000001 HC RX 250 WO HCPCS SELF ADMINISTERED DRUGS (ALT 637 FOR MEDICARE OP)

## 2025-07-08 PROCEDURE — 2720000007 HC OR 272 NO HCPCS: Performed by: OBSTETRICS & GYNECOLOGY

## 2025-07-08 PROCEDURE — 2500000004 HC RX 250 GENERAL PHARMACY W/ HCPCS (ALT 636 FOR OP/ED)

## 2025-07-08 PROCEDURE — 0UB70ZZ EXCISION OF BILATERAL FALLOPIAN TUBES, OPEN APPROACH: ICD-10-PCS | Performed by: OBSTETRICS & GYNECOLOGY

## 2025-07-08 PROCEDURE — 3700000014 HC AN EPIDURAL BLOCK CHARGE: Performed by: OBSTETRICS & GYNECOLOGY

## 2025-07-08 PROCEDURE — 1100000001 HC PRIVATE ROOM DAILY

## 2025-07-08 PROCEDURE — 59050 FETAL MONITOR W/REPORT: CPT

## 2025-07-08 PROCEDURE — 7200000001 HC TUBAL LIGATION: Performed by: OBSTETRICS & GYNECOLOGY

## 2025-07-08 PROCEDURE — 2500000004 HC RX 250 GENERAL PHARMACY W/ HCPCS (ALT 636 FOR OP/ED): Performed by: NURSE ANESTHETIST, CERTIFIED REGISTERED

## 2025-07-08 PROCEDURE — 2720000007 HC OR 272 NO HCPCS

## 2025-07-08 PROCEDURE — 99214 OFFICE O/P EST MOD 30 MIN: CPT

## 2025-07-08 RX ORDER — OXYTOCIN/0.9 % SODIUM CHLORIDE 30/500 ML
60 PLASTIC BAG, INJECTION (ML) INTRAVENOUS ONCE AS NEEDED
Status: DISCONTINUED | OUTPATIENT
Start: 2025-07-08 | End: 2025-07-08 | Stop reason: HOSPADM

## 2025-07-08 RX ORDER — SODIUM CHLORIDE, SODIUM LACTATE, POTASSIUM CHLORIDE, CALCIUM CHLORIDE 600; 310; 30; 20 MG/100ML; MG/100ML; MG/100ML; MG/100ML
75 INJECTION, SOLUTION INTRAVENOUS CONTINUOUS
Status: DISCONTINUED | OUTPATIENT
Start: 2025-07-08 | End: 2025-07-08

## 2025-07-08 RX ORDER — DIPHENHYDRAMINE HCL 25 MG
25 CAPSULE ORAL EVERY 4 HOURS PRN
Status: DISCONTINUED | OUTPATIENT
Start: 2025-07-08 | End: 2025-07-11 | Stop reason: HOSPADM

## 2025-07-08 RX ORDER — LABETALOL HYDROCHLORIDE 5 MG/ML
20 INJECTION, SOLUTION INTRAVENOUS ONCE AS NEEDED
Status: DISCONTINUED | OUTPATIENT
Start: 2025-07-08 | End: 2025-07-11 | Stop reason: HOSPADM

## 2025-07-08 RX ORDER — KETOROLAC TROMETHAMINE 30 MG/ML
INJECTION, SOLUTION INTRAMUSCULAR; INTRAVENOUS AS NEEDED
Status: DISCONTINUED | OUTPATIENT
Start: 2025-07-08 | End: 2025-07-08

## 2025-07-08 RX ORDER — MISOPROSTOL 200 UG/1
800 TABLET ORAL ONCE AS NEEDED
Status: DISCONTINUED | OUTPATIENT
Start: 2025-07-08 | End: 2025-07-08 | Stop reason: HOSPADM

## 2025-07-08 RX ORDER — MISOPROSTOL 200 UG/1
800 TABLET ORAL ONCE AS NEEDED
Status: DISCONTINUED | OUTPATIENT
Start: 2025-07-08 | End: 2025-07-11 | Stop reason: HOSPADM

## 2025-07-08 RX ORDER — ONDANSETRON HYDROCHLORIDE 2 MG/ML
4 INJECTION, SOLUTION INTRAVENOUS EVERY 6 HOURS PRN
Status: DISCONTINUED | OUTPATIENT
Start: 2025-07-08 | End: 2025-07-08

## 2025-07-08 RX ORDER — ACETAMINOPHEN 120 MG/1
SUPPOSITORY RECTAL AS NEEDED
Status: DISCONTINUED | OUTPATIENT
Start: 2025-07-08 | End: 2025-07-08

## 2025-07-08 RX ORDER — CARBOPROST TROMETHAMINE 250 UG/ML
250 INJECTION, SOLUTION INTRAMUSCULAR ONCE AS NEEDED
Status: DISCONTINUED | OUTPATIENT
Start: 2025-07-08 | End: 2025-07-08 | Stop reason: HOSPADM

## 2025-07-08 RX ORDER — FENTANYL CITRATE 50 UG/ML
INJECTION, SOLUTION INTRAMUSCULAR; INTRAVENOUS AS NEEDED
Status: DISCONTINUED | OUTPATIENT
Start: 2025-07-08 | End: 2025-07-08

## 2025-07-08 RX ORDER — ACETAMINOPHEN 325 MG/1
975 TABLET ORAL EVERY 6 HOURS
Status: DISCONTINUED | OUTPATIENT
Start: 2025-07-08 | End: 2025-07-11 | Stop reason: HOSPADM

## 2025-07-08 RX ORDER — HYDRALAZINE HYDROCHLORIDE 20 MG/ML
5 INJECTION INTRAMUSCULAR; INTRAVENOUS ONCE AS NEEDED
Status: DISCONTINUED | OUTPATIENT
Start: 2025-07-08 | End: 2025-07-11 | Stop reason: HOSPADM

## 2025-07-08 RX ORDER — FAMOTIDINE 10 MG/ML
INJECTION INTRAVENOUS AS NEEDED
Status: DISCONTINUED | OUTPATIENT
Start: 2025-07-08 | End: 2025-07-08

## 2025-07-08 RX ORDER — PHENYLEPHRINE 10 MG/250 ML(40 MCG/ML)IN 0.9 % SOD.CHLORIDE INTRAVENOUS
CONTINUOUS PRN
Status: DISCONTINUED | OUTPATIENT
Start: 2025-07-08 | End: 2025-07-08

## 2025-07-08 RX ORDER — TRANEXAMIC ACID 1 G/10ML
1000 INJECTION, SOLUTION INTRAVENOUS ONCE AS NEEDED
Status: ACTIVE | OUTPATIENT
Start: 2025-07-08 | End: 2025-07-11

## 2025-07-08 RX ORDER — DIPHENHYDRAMINE HYDROCHLORIDE 50 MG/ML
25 INJECTION, SOLUTION INTRAMUSCULAR; INTRAVENOUS EVERY 4 HOURS PRN
Status: DISCONTINUED | OUTPATIENT
Start: 2025-07-08 | End: 2025-07-11 | Stop reason: HOSPADM

## 2025-07-08 RX ORDER — METHYLERGONOVINE MALEATE 0.2 MG/ML
0.2 INJECTION INTRAVENOUS ONCE AS NEEDED
Status: DISCONTINUED | OUTPATIENT
Start: 2025-07-08 | End: 2025-07-11 | Stop reason: HOSPADM

## 2025-07-08 RX ORDER — HYDROMORPHONE HYDROCHLORIDE 0.2 MG/ML
0.2 INJECTION INTRAMUSCULAR; INTRAVENOUS; SUBCUTANEOUS EVERY 5 MIN PRN
Status: DISCONTINUED | OUTPATIENT
Start: 2025-07-08 | End: 2025-07-11 | Stop reason: HOSPADM

## 2025-07-08 RX ORDER — LIDOCAINE HYDROCHLORIDE 10 MG/ML
20 INJECTION, SOLUTION INFILTRATION; PERINEURAL ONCE AS NEEDED
Status: DISCONTINUED | OUTPATIENT
Start: 2025-07-08 | End: 2025-07-08 | Stop reason: HOSPADM

## 2025-07-08 RX ORDER — ONDANSETRON HYDROCHLORIDE 2 MG/ML
4 INJECTION, SOLUTION INTRAVENOUS EVERY 6 HOURS PRN
Status: DISCONTINUED | OUTPATIENT
Start: 2025-07-08 | End: 2025-07-11 | Stop reason: HOSPADM

## 2025-07-08 RX ORDER — ONDANSETRON 4 MG/1
4 TABLET, FILM COATED ORAL EVERY 6 HOURS PRN
Status: DISCONTINUED | OUTPATIENT
Start: 2025-07-08 | End: 2025-07-08

## 2025-07-08 RX ORDER — OXYTOCIN 10 [USP'U]/ML
10 INJECTION, SOLUTION INTRAMUSCULAR; INTRAVENOUS ONCE AS NEEDED
Status: DISCONTINUED | OUTPATIENT
Start: 2025-07-08 | End: 2025-07-11 | Stop reason: HOSPADM

## 2025-07-08 RX ORDER — LOPERAMIDE HYDROCHLORIDE 2 MG/1
4 CAPSULE ORAL EVERY 2 HOUR PRN
Status: DISCONTINUED | OUTPATIENT
Start: 2025-07-08 | End: 2025-07-11 | Stop reason: HOSPADM

## 2025-07-08 RX ORDER — HYDROXYZINE HYDROCHLORIDE 25 MG/1
50 TABLET, FILM COATED ORAL ONCE
Status: COMPLETED | OUTPATIENT
Start: 2025-07-08 | End: 2025-07-08

## 2025-07-08 RX ORDER — CARBOPROST TROMETHAMINE 250 UG/ML
250 INJECTION, SOLUTION INTRAMUSCULAR ONCE AS NEEDED
Status: DISCONTINUED | OUTPATIENT
Start: 2025-07-08 | End: 2025-07-11 | Stop reason: HOSPADM

## 2025-07-08 RX ORDER — SIMETHICONE 80 MG
80 TABLET,CHEWABLE ORAL 4 TIMES DAILY PRN
Status: DISCONTINUED | OUTPATIENT
Start: 2025-07-08 | End: 2025-07-11 | Stop reason: HOSPADM

## 2025-07-08 RX ORDER — CEFAZOLIN 1 G/1
INJECTION, POWDER, FOR SOLUTION INTRAVENOUS AS NEEDED
Status: DISCONTINUED | OUTPATIENT
Start: 2025-07-08 | End: 2025-07-08

## 2025-07-08 RX ORDER — ADHESIVE BANDAGE
10 BANDAGE TOPICAL
Status: DISCONTINUED | OUTPATIENT
Start: 2025-07-08 | End: 2025-07-11 | Stop reason: HOSPADM

## 2025-07-08 RX ORDER — NALOXONE HYDROCHLORIDE 0.4 MG/ML
0.1 INJECTION, SOLUTION INTRAMUSCULAR; INTRAVENOUS; SUBCUTANEOUS EVERY 5 MIN PRN
Status: DISCONTINUED | OUTPATIENT
Start: 2025-07-08 | End: 2025-07-11 | Stop reason: HOSPADM

## 2025-07-08 RX ORDER — KETOROLAC TROMETHAMINE 30 MG/ML
30 INJECTION, SOLUTION INTRAMUSCULAR; INTRAVENOUS EVERY 6 HOURS
Status: COMPLETED | OUTPATIENT
Start: 2025-07-08 | End: 2025-07-09

## 2025-07-08 RX ORDER — OXYTOCIN/0.9 % SODIUM CHLORIDE 30/500 ML
60 PLASTIC BAG, INJECTION (ML) INTRAVENOUS ONCE AS NEEDED
Status: DISCONTINUED | OUTPATIENT
Start: 2025-07-08 | End: 2025-07-11 | Stop reason: HOSPADM

## 2025-07-08 RX ORDER — DEXMEDETOMIDINE IN 0.9 % NACL 20 MCG/5ML
SYRINGE (ML) INTRAVENOUS AS NEEDED
Status: DISCONTINUED | OUTPATIENT
Start: 2025-07-08 | End: 2025-07-08

## 2025-07-08 RX ORDER — TRANEXAMIC ACID 1 G/10ML
1000 INJECTION, SOLUTION INTRAVENOUS ONCE AS NEEDED
Status: DISCONTINUED | OUTPATIENT
Start: 2025-07-08 | End: 2025-07-08 | Stop reason: HOSPADM

## 2025-07-08 RX ORDER — MORPHINE SULFATE 0.5 MG/ML
INJECTION, SOLUTION EPIDURAL; INTRATHECAL; INTRAVENOUS AS NEEDED
Status: DISCONTINUED | OUTPATIENT
Start: 2025-07-08 | End: 2025-07-08

## 2025-07-08 RX ORDER — LIDOCAINE HYDROCHLORIDE AND EPINEPHRINE 15; 5 MG/ML; UG/ML
INJECTION, SOLUTION EPIDURAL AS NEEDED
Status: DISCONTINUED | OUTPATIENT
Start: 2025-07-08 | End: 2025-07-08

## 2025-07-08 RX ORDER — VENLAFAXINE HYDROCHLORIDE 150 MG/1
150 CAPSULE, EXTENDED RELEASE ORAL DAILY
Status: DISCONTINUED | OUTPATIENT
Start: 2025-07-08 | End: 2025-07-09

## 2025-07-08 RX ORDER — LIDOCAINE 560 MG/1
1 PATCH PERCUTANEOUS; TOPICAL; TRANSDERMAL
Status: DISCONTINUED | OUTPATIENT
Start: 2025-07-08 | End: 2025-07-11 | Stop reason: HOSPADM

## 2025-07-08 RX ORDER — ENOXAPARIN SODIUM 100 MG/ML
40 INJECTION SUBCUTANEOUS EVERY 24 HOURS
Status: DISCONTINUED | OUTPATIENT
Start: 2025-07-08 | End: 2025-07-11 | Stop reason: HOSPADM

## 2025-07-08 RX ORDER — IBUPROFEN 600 MG/1
600 TABLET, FILM COATED ORAL EVERY 6 HOURS
Status: DISCONTINUED | OUTPATIENT
Start: 2025-07-09 | End: 2025-07-11 | Stop reason: HOSPADM

## 2025-07-08 RX ORDER — HYDRALAZINE HYDROCHLORIDE 20 MG/ML
5 INJECTION INTRAMUSCULAR; INTRAVENOUS ONCE AS NEEDED
Status: DISCONTINUED | OUTPATIENT
Start: 2025-07-08 | End: 2025-07-08 | Stop reason: HOSPADM

## 2025-07-08 RX ORDER — OXYTOCIN 10 [USP'U]/ML
10 INJECTION, SOLUTION INTRAMUSCULAR; INTRAVENOUS ONCE AS NEEDED
Status: DISCONTINUED | OUTPATIENT
Start: 2025-07-08 | End: 2025-07-08 | Stop reason: HOSPADM

## 2025-07-08 RX ORDER — OXYCODONE HYDROCHLORIDE 10 MG/1
10 TABLET ORAL EVERY 4 HOURS PRN
Status: DISCONTINUED | OUTPATIENT
Start: 2025-07-09 | End: 2025-07-11 | Stop reason: HOSPADM

## 2025-07-08 RX ORDER — CALCIUM CARBONATE 200(500)MG
1 TABLET,CHEWABLE ORAL EVERY 6 HOURS PRN
Status: DISCONTINUED | OUTPATIENT
Start: 2025-07-08 | End: 2025-07-08

## 2025-07-08 RX ORDER — METHYLERGONOVINE MALEATE 0.2 MG/ML
0.2 INJECTION INTRAVENOUS ONCE AS NEEDED
Status: DISCONTINUED | OUTPATIENT
Start: 2025-07-08 | End: 2025-07-08 | Stop reason: HOSPADM

## 2025-07-08 RX ORDER — BUPIVACAINE HYDROCHLORIDE 7.5 MG/ML
INJECTION INTRAVENOUS AS NEEDED
Status: DISCONTINUED | OUTPATIENT
Start: 2025-07-08 | End: 2025-07-08

## 2025-07-08 RX ORDER — ONDANSETRON 4 MG/1
4 TABLET, FILM COATED ORAL EVERY 6 HOURS PRN
Status: DISCONTINUED | OUTPATIENT
Start: 2025-07-08 | End: 2025-07-11 | Stop reason: HOSPADM

## 2025-07-08 RX ORDER — LOPERAMIDE HYDROCHLORIDE 2 MG/1
4 CAPSULE ORAL EVERY 2 HOUR PRN
Status: DISCONTINUED | OUTPATIENT
Start: 2025-07-08 | End: 2025-07-08 | Stop reason: HOSPADM

## 2025-07-08 RX ORDER — POLYETHYLENE GLYCOL 3350 17 G/17G
17 POWDER, FOR SOLUTION ORAL 2 TIMES DAILY PRN
Status: DISCONTINUED | OUTPATIENT
Start: 2025-07-08 | End: 2025-07-11 | Stop reason: HOSPADM

## 2025-07-08 RX ORDER — LABETALOL HYDROCHLORIDE 5 MG/ML
20 INJECTION, SOLUTION INTRAVENOUS ONCE AS NEEDED
Status: DISCONTINUED | OUTPATIENT
Start: 2025-07-08 | End: 2025-07-08 | Stop reason: HOSPADM

## 2025-07-08 RX ORDER — OXYCODONE HYDROCHLORIDE 5 MG/1
5 TABLET ORAL EVERY 4 HOURS PRN
Status: DISCONTINUED | OUTPATIENT
Start: 2025-07-09 | End: 2025-07-11 | Stop reason: HOSPADM

## 2025-07-08 RX ORDER — TERBUTALINE SULFATE 1 MG/ML
0.25 INJECTION SUBCUTANEOUS ONCE AS NEEDED
Status: DISCONTINUED | OUTPATIENT
Start: 2025-07-08 | End: 2025-07-08 | Stop reason: HOSPADM

## 2025-07-08 RX ADMIN — OXYTOCIN 600 MILLI-UNITS/MIN: 10 INJECTION, SOLUTION INTRAMUSCULAR; INTRAVENOUS at 05:02

## 2025-07-08 RX ADMIN — PHENYLEPHRINE-NACL IV SOLUTION 10 MG/250ML-0.9% 0.61 MCG/KG/MIN: 10-0.9/25 SOLUTION at 04:29

## 2025-07-08 RX ADMIN — MORPHINE SULFATE 0.15 MG: 0.5 INJECTION EPIDURAL; INTRATHECAL; INTRAVENOUS at 04:28

## 2025-07-08 RX ADMIN — ACETAMINOPHEN 975 MG: 325 TABLET ORAL at 18:25

## 2025-07-08 RX ADMIN — Medication 4 MCG: at 04:40

## 2025-07-08 RX ADMIN — KETOROLAC TROMETHAMINE 30 MG: 30 INJECTION, SOLUTION INTRAMUSCULAR; INTRAVENOUS at 05:37

## 2025-07-08 RX ADMIN — Medication 8 MCG: at 05:05

## 2025-07-08 RX ADMIN — ONDANSETRON 4 MG: 2 INJECTION INTRAMUSCULAR; INTRAVENOUS at 04:16

## 2025-07-08 RX ADMIN — Medication 12 MCG: at 05:07

## 2025-07-08 RX ADMIN — AZITHROMYCIN 500 MG: 500 INJECTION, POWDER, LYOPHILIZED, FOR SOLUTION INTRAVENOUS at 04:40

## 2025-07-08 RX ADMIN — HYDROXYZINE HYDROCHLORIDE 50 MG: 25 TABLET, FILM COATED ORAL at 08:15

## 2025-07-08 RX ADMIN — KETOROLAC TROMETHAMINE 30 MG: 30 INJECTION, SOLUTION INTRAMUSCULAR; INTRAVENOUS at 12:32

## 2025-07-08 RX ADMIN — FAMOTIDINE 20 MG: 10 INJECTION INTRAVENOUS at 04:40

## 2025-07-08 RX ADMIN — ENOXAPARIN SODIUM 40 MG: 100 INJECTION SUBCUTANEOUS at 18:25

## 2025-07-08 RX ADMIN — BUPIVACAINE HYDROCHLORIDE IN DEXTROSE 1.8 ML: 7.5 INJECTION, SOLUTION SUBARACHNOID at 04:28

## 2025-07-08 RX ADMIN — SODIUM CHLORIDE, SODIUM LACTATE, POTASSIUM CHLORIDE, AND CALCIUM CHLORIDE: .6; .31; .03; .02 INJECTION, SOLUTION INTRAVENOUS at 05:09

## 2025-07-08 RX ADMIN — ACETAMINOPHEN 975 MG: 325 TABLET ORAL at 12:32

## 2025-07-08 RX ADMIN — FENTANYL CITRATE 100 MCG: 50 INJECTION, SOLUTION INTRAMUSCULAR; INTRAVENOUS at 04:36

## 2025-07-08 RX ADMIN — Medication 4 MCG: at 04:45

## 2025-07-08 RX ADMIN — SODIUM CHLORIDE, SODIUM LACTATE, POTASSIUM CHLORIDE, AND CALCIUM CHLORIDE: .6; .31; .03; .02 INJECTION, SOLUTION INTRAVENOUS at 04:11

## 2025-07-08 RX ADMIN — CEFAZOLIN 2 G: 1 INJECTION, POWDER, FOR SOLUTION INTRAMUSCULAR; INTRAVENOUS at 04:32

## 2025-07-08 RX ADMIN — DEXAMETHASONE SODIUM PHOSPHATE 4 MG: 4 INJECTION, SOLUTION INTRA-ARTICULAR; INTRALESIONAL; INTRAMUSCULAR; INTRAVENOUS; SOFT TISSUE at 04:59

## 2025-07-08 RX ADMIN — Medication 8 MCG: at 04:50

## 2025-07-08 RX ADMIN — ACETAMINOPHEN 650 MG: 120 SUPPOSITORY RECTAL at 06:15

## 2025-07-08 RX ADMIN — Medication 4 MCG: at 04:43

## 2025-07-08 RX ADMIN — LIDOCAINE HYDROCHLORIDE AND EPINEPHRINE 3 ML: 15; 5 INJECTION, SOLUTION EPIDURAL at 05:19

## 2025-07-08 RX ADMIN — KETOROLAC TROMETHAMINE 30 MG: 30 INJECTION, SOLUTION INTRAMUSCULAR; INTRAVENOUS at 18:25

## 2025-07-08 SDOH — HEALTH STABILITY: MENTAL HEALTH: NON-SPECIFIC ACTIVE SUICIDAL THOUGHTS (PAST 1 MONTH): NO

## 2025-07-08 SDOH — ECONOMIC STABILITY: FOOD INSECURITY: WITHIN THE PAST 12 MONTHS, THE FOOD YOU BOUGHT JUST DIDN'T LAST AND YOU DIDN'T HAVE MONEY TO GET MORE.: NEVER TRUE

## 2025-07-08 SDOH — SOCIAL STABILITY: SOCIAL INSECURITY
WITHIN THE LAST YEAR, HAVE YOU BEEN KICKED, HIT, SLAPPED, OR OTHERWISE PHYSICALLY HURT BY YOUR PARTNER OR EX-PARTNER?: NO

## 2025-07-08 SDOH — ECONOMIC STABILITY: HOUSING INSECURITY: DO YOU FEEL UNSAFE GOING BACK TO THE PLACE WHERE YOU ARE LIVING?: NO

## 2025-07-08 SDOH — ECONOMIC STABILITY: FOOD INSECURITY: HOW HARD IS IT FOR YOU TO PAY FOR THE VERY BASICS LIKE FOOD, HOUSING, MEDICAL CARE, AND HEATING?: NOT HARD AT ALL

## 2025-07-08 SDOH — SOCIAL STABILITY: SOCIAL INSECURITY: ARE YOU OR HAVE YOU BEEN THREATENED OR ABUSED PHYSICALLY, EMOTIONALLY, OR SEXUALLY BY ANYONE?: NO

## 2025-07-08 SDOH — SOCIAL STABILITY: SOCIAL INSECURITY: PHYSICAL ABUSE: DENIES

## 2025-07-08 SDOH — ECONOMIC STABILITY: FOOD INSECURITY: WITHIN THE PAST 12 MONTHS, YOU WORRIED THAT YOUR FOOD WOULD RUN OUT BEFORE YOU GOT THE MONEY TO BUY MORE.: NEVER TRUE

## 2025-07-08 SDOH — SOCIAL STABILITY: SOCIAL INSECURITY: WITHIN THE LAST YEAR, HAVE YOU BEEN AFRAID OF YOUR PARTNER OR EX-PARTNER?: NO

## 2025-07-08 SDOH — HEALTH STABILITY: MENTAL HEALTH: WISH TO BE DEAD (PAST 1 MONTH): NO

## 2025-07-08 SDOH — HEALTH STABILITY: MENTAL HEALTH: HAVE YOU USED ANY SUBSTANCES (CANABIS, COCAINE, HEROIN, HALLUCINOGENS, INHALANTS, ETC.) IN THE PAST 12 MONTHS?: NO

## 2025-07-08 SDOH — SOCIAL STABILITY: SOCIAL INSECURITY
WITHIN THE LAST YEAR, HAVE YOU BEEN RAPED OR FORCED TO HAVE ANY KIND OF SEXUAL ACTIVITY BY YOUR PARTNER OR EX-PARTNER?: NO

## 2025-07-08 SDOH — SOCIAL STABILITY: SOCIAL INSECURITY: HAVE YOU HAD THOUGHTS OF HARMING ANYONE ELSE?: NO

## 2025-07-08 SDOH — SOCIAL STABILITY: SOCIAL INSECURITY: ABUSE SCREEN: ADULT

## 2025-07-08 SDOH — HEALTH STABILITY: MENTAL HEALTH: SUICIDAL BEHAVIOR (LIFETIME): NO

## 2025-07-08 SDOH — SOCIAL STABILITY: SOCIAL INSECURITY: ARE THERE ANY APPARENT SIGNS OF INJURIES/BEHAVIORS THAT COULD BE RELATED TO ABUSE/NEGLECT?: NO

## 2025-07-08 SDOH — SOCIAL STABILITY: SOCIAL INSECURITY: WITHIN THE LAST YEAR, HAVE YOU BEEN HUMILIATED OR EMOTIONALLY ABUSED IN OTHER WAYS BY YOUR PARTNER OR EX-PARTNER?: NO

## 2025-07-08 SDOH — SOCIAL STABILITY: SOCIAL INSECURITY: HAVE YOU HAD ANY THOUGHTS OF HARMING ANYONE ELSE?: NO

## 2025-07-08 SDOH — HEALTH STABILITY: MENTAL HEALTH: WERE YOU ABLE TO COMPLETE ALL THE BEHAVIORAL HEALTH SCREENINGS?: YES

## 2025-07-08 SDOH — SOCIAL STABILITY: SOCIAL INSECURITY: DOES ANYONE TRY TO KEEP YOU FROM HAVING/CONTACTING OTHER FRIENDS OR DOING THINGS OUTSIDE YOUR HOME?: NO

## 2025-07-08 SDOH — SOCIAL STABILITY: SOCIAL INSECURITY: DO YOU FEEL ANYONE HAS EXPLOITED OR TAKEN ADVANTAGE OF YOU FINANCIALLY OR OF YOUR PERSONAL PROPERTY?: NO

## 2025-07-08 SDOH — SOCIAL STABILITY: SOCIAL INSECURITY: VERBAL ABUSE: DENIES

## 2025-07-08 SDOH — ECONOMIC STABILITY: TRANSPORTATION INSECURITY: IN THE PAST 12 MONTHS, HAS LACK OF TRANSPORTATION KEPT YOU FROM MEDICAL APPOINTMENTS OR FROM GETTING MEDICATIONS?: NO

## 2025-07-08 SDOH — HEALTH STABILITY: MENTAL HEALTH: HAVE YOU USED ANY PRESCRIPTION DRUGS OTHER THAN PRESCRIBED IN THE PAST 12 MONTHS?: NO

## 2025-07-08 SDOH — SOCIAL STABILITY: SOCIAL INSECURITY: HAS ANYONE EVER THREATENED TO HURT YOUR FAMILY OR YOUR PETS?: NO

## 2025-07-08 ASSESSMENT — PAIN SCALES - GENERAL
PAINLEVEL_OUTOF10: 0 - NO PAIN
PAINLEVEL_OUTOF10: 5 - MODERATE PAIN
PAINLEVEL_OUTOF10: 0 - NO PAIN
PAINLEVEL_OUTOF10: 0 - NO PAIN
PAIN_LEVEL: 0
PAINLEVEL_OUTOF10: 5 - MODERATE PAIN
PAINLEVEL_OUTOF10: 0 - NO PAIN
PAINLEVEL_OUTOF10: 1

## 2025-07-08 ASSESSMENT — ACTIVITIES OF DAILY LIVING (ADL): LACK_OF_TRANSPORTATION: NO

## 2025-07-08 ASSESSMENT — LIFESTYLE VARIABLES
AUDIT-C TOTAL SCORE: 0
HOW OFTEN DO YOU HAVE A DRINK CONTAINING ALCOHOL: NEVER
HOW MANY STANDARD DRINKS CONTAINING ALCOHOL DO YOU HAVE ON A TYPICAL DAY: PATIENT DOES NOT DRINK
SKIP TO QUESTIONS 9-10: 1
AUDIT-C TOTAL SCORE: 0
HOW OFTEN DO YOU HAVE 6 OR MORE DRINKS ON ONE OCCASION: NEVER

## 2025-07-08 ASSESSMENT — PATIENT HEALTH QUESTIONNAIRE - PHQ9
1. LITTLE INTEREST OR PLEASURE IN DOING THINGS: NOT AT ALL
2. FEELING DOWN, DEPRESSED OR HOPELESS: NOT AT ALL
SUM OF ALL RESPONSES TO PHQ9 QUESTIONS 1 & 2: 0

## 2025-07-08 ASSESSMENT — PAIN DESCRIPTION - DESCRIPTORS: DESCRIPTORS: DISCOMFORT

## 2025-07-08 NOTE — LACTATION NOTE
Lactation Consultant Note    Recommendations/Summary  Per bedside RN mother plans to formula feed and exclusively pump. I attempted to see mother to discuss getting started pumping, many visitors were at bedside. I instructed mother to call for lactation or let her RN know when she is ready to discuss pumping. Bedside RN updated.

## 2025-07-08 NOTE — ANESTHESIA PREPROCEDURE EVALUATION
Patient: Linda Bhardwaj    Evaluation Method: In-person visit    Procedure Information       Date: 25    Procedure:  DELIVERY    Location: [facility] 2 OB    Surgeons: Suzi Vinson MD            Relevant Problems   Neuro   (+) Anxiety and depression      GYN   (+) 38 weeks gestation of pregnancy ([facility])   (+) Multigravida of advanced maternal age in first trimester ([facility])       Clinical information reviewed:    Allergies  Meds               NPO Detail:  No data recorded     OB/GYN     Physical Exam    Airway  Mallampati: II  TM distance: >3 FB  Neck ROM: full  Mouth opening: 3 or more finger widths     Cardiovascular - normal exam   Dental - normal exam     Pulmonary - normal exam   Abdominal            Anesthesia Plan    History of general anesthesia?: yes  History of complications of general anesthesia?: no    ASA 2     CSE     Anesthetic plan and risks discussed with patient.  Use of blood products discussed with patient who consented to blood products.    Plan discussed with CRNA and attending.

## 2025-07-08 NOTE — ANESTHESIA PROCEDURE NOTES
CSE Block    Patient location during procedure: OR  Start time: 7/8/2025 4:14 AM  End time: 7/8/2025 4:29 AM  Reason for block: primary anesthetic}  Staffing  Performed: CRNA   Authorized by: Luis Fernando Sarkar MD    Performed by: AXEL Kilgore-CRNA    Preanesthetic Checklist  Completed: patient identified, IV checked, risks and benefits discussed, surgical consent, monitors and equipment checked, pre-op evaluation, timeout performed and sterile techniques followed  Block Timeout  RN/Licensed healthcare professional reads aloud to the Anesthesia provider and entire team: Patient identity, procedure with side and site, patient position, and as applicable the availability of implants/special equipment/special requirements.  Patient on coagulant treatment: no  Timeout performed at: 7/8/2025 4:14 AM    CSE Block  Patient position: sitting  Prep: ChloraPrep  Sterility prep: cap, drape, gloves, hand and mask  Sedation level: no sedation  Patient monitoring: blood pressure, continuous pulse oximetry and heart rate  Approach: midline  Local numbing: lidocaine 1% to skin and subcutaneous tissues  Vertebral space: lumbar  L3-4  Guidance: landmark technique    Epidural Needle  TARYN technique: saline  Needle type: Tuohy   Needle gauge: 17 G  Needle length: 8.9 cm  Needle insertion depth: 6 cm  Spinal Needle  Needle type: pencil-point   Needle gauge: 25 G  Needle length: 12.7 cm  Free flow CSF: yes  Epidural Catheter  Catheter type: multi-orifice  Catheter size: 20 G  Catheter at skin depth: 10 cm  Catheter securement method: clear occlusive dressing and liquid medical adhesive              Assessment  Number of attempts: 1  Procedure assessment: patient tolerated procedure well with no immediate complications

## 2025-07-08 NOTE — H&P
Obstetrical Admission History and Physical    Assessment/Plan    Linda Bhardwaj is a 39 y.o.  at 39w2d by LMP c/w 13w US who presents in labor and for rLTCS & BS.    Labor  - Patient presented to triage with strong contractions  - SVE: 3/80/-3, changed from exam in office  - Due to change in cervical exam and contractions, patient is currently in latent labor  - Plan for rLTCS and BS    Non-scheduled Non-urgent rLTCS and BS  -Admitted, consented, scanned - cephalic  -Patient counseled on risks of  section including, bleeding, infection, injury to bowel, bladder, pelvic vasculature, pelvic nerves, fallopian tubes, and ovaries.   -Establish IV access and epidural per anesthesia   -Pre-op hgb 12.3, T&C x1U pRBCs on admission   -Plan for routine post operative care after procedure    Maternal Comorbidities  - Hx of LTCS x 2  - Abnormal 1hr GTT, normal 3hr GTT  - MRBP x 1 on admission, no other hx of elevated BP in pregnancy  - Hx of migraines w/o aura  - Hx of depression and anxiety, on Effexor    Fetal Well-Being  -PNLs reviewed and WNL   -CEFM, Cat 1 currently  -GBS pos, plan for Ancef in OR  -Mild polyhydramnios: AC >99%, EFW 3384g 72% on  US  - Rh negative, rhogam given     Postpartum Planning  -Feeding: breast/bottle  -PPBC: tubal ligation    DVT PPx  -SCDs  -Ambulation    Dispo: admit for scheduled rCS    D/w Dr. Daniel Hawkins MD  PGY-1    Subjective   Linda Bhardwaj is a 39 y.o.  at 39w2d by LMP c/w 13w US who presents to triage due to contractions; originally had scheduled rCS planned for 7/10.    Patient reports sporadic contractions throughout the day on , however, strength and frequency of contractions increased at 9pm to 10/10 pain and q10 mins. She also reports some vaginal discharge, denies LOF, VB, endorses good FM.     Pregnancy notable for:  - Abnormal 1hr GTT, normal 3hr GTT  - Mild polyhydramnios, AC >99%, EFW 3384g 72% on  US  - Hx of LTCS x 2  - Rh  negative, rhogam given   - Hx of migraines w/o aura  - Hx of depression and anxiety, on Effexor  - AMA      Obstetrical History   OB History    Para Term  AB Living   4 2 2  1 2   SAB IAB Ectopic Multiple Live Births   1    2      # Outcome Date GA Lbr Prabhakar/2nd Weight Sex Type Anes PTL Lv   4 Current            3 Term 21 39w0d  3.345 kg F CS-LTranv CSE N BLAIR   2 Term 2019    F CS-LTranv CSE N BLAIR      Birth Comments: Breech, in labor   1 SAB 2018              Obstetric Comments   History of twin pregnancy in 2019 with loss of twin B at 9 weeks.  No complications from  section for breech.       Past Medical History  Medical History[1]     Past Surgical History   Surgical History[2]    Social History  Social History     Tobacco Use    Smoking status: Never    Smokeless tobacco: Not on file   Substance Use Topics    Alcohol use: Never     Substance and Sexual Activity   Drug Use Never       Allergies  Cefaclor     Medications  Prescriptions Prior to Admission[3]    Objective    Last Vitals  Temp Pulse Resp BP MAP O2 Sat   36.5 °C (97.7 °F) 78 14 122/86   97 %     Physical Examination  General: no acute distress  HEENT: normocephalic, atraumatic  Heart: warm and well perfused  Lungs: breathing comfortably on room air  Abdomen: gravid  Extremities: moving all extremities  Neuro: awake and conversant  Psych: appropriate mood and affect  SVE: 3/80/-3  FHT: 140/mod/+accel/-decel  Lutz: difficulty monitoring contractions, q5-8 mins   BSUS: cephalic    Lab Review  Labs in chart were reviewed.  CBC   Recent Labs     25  0256   WBC 11.2   HGB 12.3   HCT 37.5        CMP   Recent Labs     25  0256   *   K 3.9      CO2 20*   ANIONGAP 17   BUN 6   CREATININE 0.58   EGFR >90   CALCIUM 9.0   ALBUMIN 3.6   PROT 6.3*   ALKPHOS 141*   ALT 14   AST 25   BILITOT 0.4            [1]   Past Medical History:  Diagnosis Date    Encounter for other preprocedural examination      Encounter for preadmission testing   [2]   Past Surgical History:  Procedure Laterality Date     SECTION, LOW TRANSVERSE      DILATION AND CURETTAGE OF UTERUS     [3]   Medications Prior to Admission   Medication Sig Dispense Refill Last Dose/Taking    ergocalciferol (Vitamin D2) 1.25 MG (58586 UT) capsule Take 1 capsule (1,250 mcg) by mouth 1 (one) time per week. 4 capsule 11 More than a month    -iron fum-folic acid (Prenatal 19) 29 mg iron- 1 mg tablet Take by mouth.   2025    venlafaxine XR (Effexor-XR) 150 mg 24 hr capsule Take 1 capsule (150 mg) by mouth once daily. Take with food. 30 capsule 11 More than a month    metoclopramide (Reglan) 10 mg tablet Take 1 tablet (10 mg) by mouth 4 times a day for 10 days. 40 tablet 2

## 2025-07-08 NOTE — ANESTHESIA POSTPROCEDURE EVALUATION
Patient: Linda Bhardwaj    Procedure Summary       Date: 25 Room / Location: [facility] OB 02 / [facility] OB    Anesthesia Start: 411 Anesthesia Stop: 629    Procedures:        DELIVERY (Abdomen)      LIGATION, FALLOPIAN TUBE, POSTPARTUM (Bilateral)      LIGATION, FALLOPIAN TUBE, POSTPARTUM (Bilateral) Diagnosis:       Request for sterilization      (Request for sterilization [Z30.2])    Surgeons: Suzi Vinson MD Responsible Provider: Luis Fernando Sarkar MD    Anesthesia Type: CSE ASA Status: 2            Anesthesia Type: CSE    Vitals Value Taken Time   /58 25 06:36   Temp 36.4 25 06:36   Pulse 79 25 06:33   Resp 14 25 06:36   SpO2 96 % 25 06:33       Anesthesia Post Evaluation    Patient location during evaluation: bedside  Patient participation: complete - patient participated  Level of consciousness: awake and alert  Pain score: 0  Pain management: adequate  Airway patency: patent  Cardiovascular status: acceptable  Respiratory status: acceptable  Hydration status: acceptable  Postoperative Nausea and Vomiting: none  Comments: Epidural capped and secured        There were no known notable events for this encounter.

## 2025-07-08 NOTE — L&D DELIVERY NOTE
Birth Operative Report    Patient Name: Linda Bhardwaj  : 1985  MRN: [id number]  Age: 39 y.o.    /Para:   Gestational Age: 39w2d    Date of Surgery: 2025    Operating Room Location: * No operating room entered *    Pre-op Diagnosis:  Intrauterine Pregnancy at 39w2d  History of CS x2  Desire for tubal sterilization  Abnormal 1hr, normal 3hr  Polyhydramnios     Post-op Diagnosis:  Same    Procedure:   Repeat Low Transverse  Section    Surgery Category at [facility] Time: Confirmed Non-scheduled, Non-urgent    Surgeon:    * Suzi Vinson - Primary    Resident/Fellow/Other Assistant:   Surgeons and Role:     * Sue Sanchez MD - Resident - Assisting     * Orly Gleason MD - Resident - Assisting    Anesthesia:  Type: CSE     Anesthesiologist: Luis Fernando Sarkar MD  CRNA: AXEL Kilgore-HEIDE    Surgical Staff:  Circulator: Digna Salguero RN  Scrub Person: Sarah Pradhan     Preoperative Antibiotics: Ancef 2 g and Azithromycin 500 mg    Indication for Procedure:   39 y.o.  at 39w2d presenting in labor, history of CS x2, desires repeat CS with TL.     Informed Consent:  The risks, benefits, complications, and alternatives were discussed with the patient. The patient understood that the risks of  section include but are not limited to infection, bleeding, injury to nearby structures or organs, possible need for transfusion, and potential need for more surgery. The patient stated understanding and desired to proceed. All questions were answered. The site of surgery was properly noted and marked. The patient's identity was confirmed, and the procedure verified as a  delivery. A Time Out was held and the above information confirmed.     Findings:   Minimal subcutaneous scar tissue. Moderate adhesions between rectus and fascia. Minimal intraperitoneal adhesions. Granular yellow fibrin-like material overlying R fallopian tube and ovary, L ovary with 2-3cm cyst  suspicious for possible endometriosis. Otherwise normal appearing gravid uterus, fallopian tubes, and ovaries. Amniotic fluid Clear, Female infant in Vertex Occiput Anterior presentation, APGARS 8 , 9 .  Birth Weight 3.7 kg.    Description of Procedure:   Patient was taken to the OR where regional anesthesia was found to be adequate.  She was then placed in the dorsal supine position with a left lateral tilt. A guzmán catheter was placed, SCDs were applied, and a vaginal prep was performed. A pre-procedure time out was performed. The patient was given a prophylactic dose of IV antibiotics.  She was then prepped and draped in the usual sterile fashion.     A Pfannenstiel skin incision was made with the scalpel through the skin and subcutaneous fat to the underlying fascial layer. The fascia was incised in the midline with the scalpel and the incision was extended bilaterally. The superior aspect of the incision was grasped, tented up with Kocher clamps and the rectus muscle was dissected off. The muscles were divided in the midline, the peritoneum was then identified and entered bluntly and incision extended superiorly and inferiorly taking care to avoid underlying viscera.  The bladder blade was inserted.       Uterine Incision was made with the scalpel, the uterine cavity was entered, and the hysterotomy was extended cephalocaudally by stretching. The infant was delivered atraumatically, the cord was clamped and cut and infant was handed off to awaiting nursing.  A cord blood sample was collected. The placenta was then expressed. The uterus was cleared of all clot and debris. The uterine incision was repaired using a running locked stitch of 0-Vicryl. Additional figure-of-eight stitches of 0-vicryl and 0-monocryl were placed at areas of additional bleeding. Adequate hemostasis was noted. A small 2cm serosal defect was noted superior to the hysterotomy, and was repaired with a figure-of-eight stitch of 0-monocryl.  Serosal bleeders were cauterized.     Attention was then turned to the bilateral salpingectomy. The distal portion of the right fallopian tube was grasped using [place] and the ligasure device was used to separate the fallopian tube from the mesosalpinx along the full length of the fallopian tube and finally transected at the level of the cornua. This was repeated on the opposite side to remove the left fallopian tube. Both sides were noted to be hemostatic.    The gutters were cleared. The hysterotomy was again evaluated and found to be hemostatic. The underside of the fascia and bladder and the rectus muscles were examined. Bleeders were cauterized along the L rectus muscle. Due to persistent bleeding, a figure-of-eight stitch of 0-vicryl was placed at the inferior aspect of the L rectus with adequate hemostasis. The rectus was again evaluated and noted to be hemostatic. The fascia was closed using a running stitch of 0-PDS.  The subcutaneous layer was irrigated, small bleeders were cauterized. The subcutaneous layer was re-approximated using interrupted stitches of 2-0 monocryl. The skin was closed with 4-0 monocryl.         * Suzi Vinson - Sloane was present for key portions of the procedure.    Additional Procedures:  Tubal Sterilization  Attention was then turned to the tubal sterilization portion of the procedure.  The right fallopian tube was grasped with a [place] and followed to the fimbriated end.  The complete Fallopian tube was then ligated and excised using the Ligasure device. Adequate hemostasis was noted.  The same procedure was performed on the left side.      Complications:      Quantitative Blood Loss:   Delivery Blood Loss: 1100 mL (7/8/2025  4:50 AM - 7/8/2025  9:21 AM)    Blood products: None   Blood Product Administration History       None            Uterotonics/Hemostatic Agent: IV Pitocin 30 units    Specimen:   Placenta  Delivered: 7/8/2025  5:06 AM  Appearance: Intact  Removal:  Expressed    Disposition: discarded    Sponge/Instrument/Needle Counts: The sponge, lap and needle counts were correct.    Patient Disposition: Patient recovering on labor and delivery in stable condition.    Hillary Bhardwaj [[id number]]      Labor Events    Sac identifier: Sac 1  Rupture date/time: 2025 0500  Rupture type: Artificial  Fluid color: Clear  Fluid odor: None  Labor type:  Without Labor  Labor allowed to proceed with plans for an attempted vaginal birth?: No  Complications: None       Placenta    Placenta delivery date/time: 2025 05:06  Placenta removal: Expressed  Placenta appearance: Intact  Placenta disposition: discarded       Cord    Vessels: 3 vessels  Complications: Nuchal  Nuchal intervention: reduced  Nuchal cord description: loose nuchal cord  Number of loops: 1  Delayed cord clamping?: Yes  Cord clamped date/time: 2025 05:03:00  Cord blood disposition: Lab  Gases sent?: No  Stem cell collection (by provider): No       Lacerations    Episiotomy: None       Anesthesia    Method: Combined spinal-epidural       Operative Delivery    Forceps attempted?: No  Vacuum extractor attempted?: No       Shoulder Dystocia    Shoulder dystocia present?: No        Delivery    Time head delivered: 2025 05:02:00  Birth date/time: 2025 05:02:00  Delivery type: , Low Transverse   categorization: repeat   priority: non-scheduled, non-urgent  Indications for : Repeat   Complications: None       Resuscitation    Method: Tactile stimulation       Apgars    Living status: Living  Apgar Component Scores:  1 min.:  5 min.:  10 min.:  15 min.:  20 min.:    Skin color:  0  1       Heart rate:  2  2       Reflex irritability:  2  2       Muscle tone:  2  2       Respiratory effort:  2  2       Total:  8  9       Apgars assigned by: PAULINA QUINN       Delivery Providers    Delivering clinician: Suzi Vinson MD   Provider Role     Digna Salguero, RN Delivery Nurse    Ashley Herrera, RN Nursery Nurse    Sue Sanchez MD Resident    Orly Gleason MD Resident    Sarah Pradhan Surgical Assistant

## 2025-07-09 ENCOUNTER — APPOINTMENT (OUTPATIENT)
Dept: OBSTETRICS AND GYNECOLOGY | Facility: CLINIC | Age: 40
End: 2025-07-09
Payer: COMMERCIAL

## 2025-07-09 DIAGNOSIS — Z34.93 THIRD TRIMESTER PREGNANCY (HHS-HCC): ICD-10-CM

## 2025-07-09 DIAGNOSIS — O34.219 PREVIOUS CESAREAN SECTION COMPLICATING PREGNANCY (HHS-HCC): ICD-10-CM

## 2025-07-09 PROBLEM — Z30.2 REQUEST FOR STERILIZATION: Status: RESOLVED | Noted: 2025-07-08 | Resolved: 2025-07-09

## 2025-07-09 PROBLEM — Z37.9 NORMAL LABOR (HHS-HCC): Status: RESOLVED | Noted: 2025-07-08 | Resolved: 2025-07-09

## 2025-07-09 PROBLEM — O09.521 MULTIGRAVIDA OF ADVANCED MATERNAL AGE IN FIRST TRIMESTER (HHS-HCC): Status: RESOLVED | Noted: 2024-12-04 | Resolved: 2025-07-09

## 2025-07-09 PROBLEM — O21.9 NAUSEA AND VOMITING IN PREGNANCY PRIOR TO 22 WEEKS GESTATION: Status: RESOLVED | Noted: 2025-01-08 | Resolved: 2025-07-09

## 2025-07-09 PROBLEM — O99.810 ABNORMAL GLUCOSE TOLERANCE AFFECTING PREGNANCY, ANTEPARTUM (HHS-HCC): Status: RESOLVED | Noted: 2025-04-16 | Resolved: 2025-07-09

## 2025-07-09 PROBLEM — Z3A.38 38 WEEKS GESTATION OF PREGNANCY (HHS-HCC): Status: RESOLVED | Noted: 2025-06-04 | Resolved: 2025-07-09

## 2025-07-09 LAB
ERYTHROCYTE [DISTWIDTH] IN BLOOD BY AUTOMATED COUNT: 13.7 % (ref 11.5–14.5)
FETAL MATERNAL SCREEN: NORMAL
HCT VFR BLD AUTO: 31.1 % (ref 36–46)
HGB BLD-MCNC: 9.8 G/DL (ref 12–16)
MCH RBC QN AUTO: 30.5 PG (ref 26–34)
MCHC RBC AUTO-ENTMCNC: 31.5 G/DL (ref 32–36)
MCV RBC AUTO: 97 FL (ref 80–100)
NRBC BLD-RTO: 0 /100 WBCS (ref 0–0)
PLATELET # BLD AUTO: 117 X10*3/UL (ref 150–450)
RBC # BLD AUTO: 3.21 X10*6/UL (ref 4–5.2)
WBC # BLD AUTO: 8.6 X10*3/UL (ref 4.4–11.3)

## 2025-07-09 PROCEDURE — 36415 COLL VENOUS BLD VENIPUNCTURE: CPT

## 2025-07-09 PROCEDURE — 2500000004 HC RX 250 GENERAL PHARMACY W/ HCPCS (ALT 636 FOR OP/ED)

## 2025-07-09 PROCEDURE — 85027 COMPLETE CBC AUTOMATED: CPT

## 2025-07-09 PROCEDURE — 2500000001 HC RX 250 WO HCPCS SELF ADMINISTERED DRUGS (ALT 637 FOR MEDICARE OP)

## 2025-07-09 PROCEDURE — 1100000001 HC PRIVATE ROOM DAILY

## 2025-07-09 PROCEDURE — 85461 HEMOGLOBIN FETAL: CPT

## 2025-07-09 RX ORDER — VENLAFAXINE HYDROCHLORIDE 37.5 MG/1
37.5 CAPSULE, EXTENDED RELEASE ORAL
Status: DISCONTINUED | OUTPATIENT
Start: 2025-07-09 | End: 2025-07-11 | Stop reason: HOSPADM

## 2025-07-09 RX ADMIN — ACETAMINOPHEN 975 MG: 325 TABLET ORAL at 06:34

## 2025-07-09 RX ADMIN — HUMAN RHO(D) IMMUNE GLOBULIN 300 MCG: 300 INJECTION, SOLUTION INTRAMUSCULAR at 06:39

## 2025-07-09 RX ADMIN — IBUPROFEN 600 MG: 600 TABLET ORAL at 06:34

## 2025-07-09 RX ADMIN — IBUPROFEN 600 MG: 600 TABLET ORAL at 12:47

## 2025-07-09 RX ADMIN — OXYCODONE HYDROCHLORIDE 10 MG: 10 TABLET ORAL at 22:57

## 2025-07-09 RX ADMIN — KETOROLAC TROMETHAMINE 30 MG: 30 INJECTION, SOLUTION INTRAMUSCULAR; INTRAVENOUS at 00:32

## 2025-07-09 RX ADMIN — HYDROMORPHONE HYDROCHLORIDE 0.2 MG: 0.2 INJECTION, SOLUTION INTRAMUSCULAR; INTRAVENOUS; SUBCUTANEOUS at 04:54

## 2025-07-09 RX ADMIN — ACETAMINOPHEN 975 MG: 325 TABLET ORAL at 18:28

## 2025-07-09 RX ADMIN — ACETAMINOPHEN 975 MG: 325 TABLET ORAL at 12:47

## 2025-07-09 RX ADMIN — OXYCODONE HYDROCHLORIDE 10 MG: 10 TABLET ORAL at 14:28

## 2025-07-09 RX ADMIN — SIMETHICONE 80 MG: 80 TABLET, CHEWABLE ORAL at 20:42

## 2025-07-09 RX ADMIN — SIMETHICONE 80 MG: 80 TABLET, CHEWABLE ORAL at 12:47

## 2025-07-09 RX ADMIN — ACETAMINOPHEN 975 MG: 325 TABLET ORAL at 00:32

## 2025-07-09 RX ADMIN — POLYETHYLENE GLYCOL 3350 17 G: 17 POWDER, FOR SOLUTION ORAL at 20:42

## 2025-07-09 RX ADMIN — OXYCODONE 5 MG: 5 TABLET ORAL at 10:14

## 2025-07-09 RX ADMIN — SIMETHICONE 80 MG: 80 TABLET, CHEWABLE ORAL at 08:29

## 2025-07-09 RX ADMIN — ENOXAPARIN SODIUM 40 MG: 100 INJECTION SUBCUTANEOUS at 18:28

## 2025-07-09 RX ADMIN — IBUPROFEN 600 MG: 600 TABLET ORAL at 18:28

## 2025-07-09 RX ADMIN — OXYCODONE 5 MG: 5 TABLET ORAL at 06:38

## 2025-07-09 ASSESSMENT — PAIN SCALES - GENERAL
PAINLEVEL_OUTOF10: 2
PAINLEVEL_OUTOF10: 2
PAINLEVEL_OUTOF10: 7
PAINLEVEL_OUTOF10: 9
PAINLEVEL_OUTOF10: 8
PAINLEVEL_OUTOF10: 10 - WORST POSSIBLE PAIN
PAINLEVEL_OUTOF10: 7

## 2025-07-09 ASSESSMENT — PAIN DESCRIPTION - DESCRIPTORS: DESCRIPTORS: CRAMPING

## 2025-07-09 NOTE — PROGRESS NOTES
Postpartum Progress Note    Assessment/Plan   Linda Bhardwaj is a 39 y.o., , who delivered at 39w2d gestation and is now postpartum day 1.  She had a repeat  #3 with bilateral salpingectomies on 2025 at 39.2 weeks.  She had spontaneous labor.  She delivered a girl weighing 8 pounds 3 ounces, Apgars 8, 9.  She has adequate pain relief with Tylenol, Motrin, oxycodone.  Ambulating well, no nausea.  Denies flatus.  Minimal lochia, no dysuria.  Vital signs stable, continue routine post op care.  Encouraged to ambulate.    Subjective   Her pain is well controlled with current medications  She is not passing flatus  She is ambulating well  She is tolerating a Adult diet Regular  She reports no breast or nursing problems  She denies emotional concerns today   Her plan for contraception is s/p salpingectomies       Objective     Last Vitals:  Temp Pulse Resp BP MAP Pulse Ox   36.3 °C (97.3 °F) 85 16 116/71 86 94 %     Vitals Min/Max Last 24 Hours:  Temp  Min: 35.7 °C (96.3 °F)  Max: 36.7 °C (98.1 °F)  Pulse  Min: 78  Max: 85  Resp  Min: 16  Max: 20  BP  Min: 111/67  Max: 125/74  MAP (mmHg)  Min: 81  Max: 92    Intake/Output:     Intake/Output Summary (Last 24 hours) at 2025 0916  Last data filed at 2025 0600  Gross per 24 hour   Intake --   Output 1800 ml   Net -1800 ml       Physical Exam:  General: Examination reveals a well developed, well nourished, female, in no acute distress. She is alert and cooperative.  Incision intact, no redness or drainage.  Hypoactive bowel sounds noted with slight distention.    Heart regular rate and rhythm, lungs clear to auscultation.    Lower extremities without edema, no calf pain.    Lab Data:  Lab Results   Component Value Date    WBC 8.6 2025    HGB 9.8 (L) 2025    HCT 31.1 (L) 2025     (L) 2025     Lab Results   Component Value Date    GLUCOSE 87 2025     (L) 2025    K 3.9 2025     2025     CO2 20 (L) 07/08/2025    ANIONGAP 17 07/08/2025    BUN 6 07/08/2025    CREATININE 0.58 07/08/2025    EGFR >90 07/08/2025    CALCIUM 9.0 07/08/2025    ALBUMIN 3.6 07/08/2025    PROT 6.3 (L) 07/08/2025    ALKPHOS 141 (H) 07/08/2025    ALT 14 07/08/2025    AST 25 07/08/2025    BILITOT 0.4 07/08/2025

## 2025-07-09 NOTE — CARE PLAN
The patient's goals for the shift include rest    The clinical goals for the shift include stable vitals      Problem: Vaginal Birth or  Section  Goal: Minimal s/sx of HDP and BP<160/110  Outcome: Progressing  Goal: No s/sx of infection through recovery  Outcome: Progressing  Goal: No s/sx of hemorrhage through recovery  Outcome: Progressing     Problem: Pain - Adult  Goal: Verbalizes/displays adequate comfort level or baseline comfort level  Outcome: Progressing     Problem: Safety - Adult  Goal: Free from fall injury  Outcome: Progressing     Problem: Skin  Goal: Decreased wound size/increased tissue granulation at next dressing change  Outcome: Progressing  Goal: Participates in plan/prevention/treatment measures  Outcome: Progressing  Goal: Prevent/manage excess moisture  Outcome: Progressing  Goal: Prevent/minimize sheer/friction injuries  Outcome: Progressing  Goal: Promote/optimize nutrition  Outcome: Progressing  Goal: Promote skin healing  Outcome: Progressing

## 2025-07-09 NOTE — LACTATION NOTE
"This note was copied from a baby's chart.  Lactation Consultant Note  Lactation Consultation  Reason for Consult: Initial assessment, Other (Comment) (mother plans to exclusively pump)  Consultant Name: Tila Ojeda RN IBCLC    Maternal Information  Has mother  before?: Yes  How long did the mother previously breastfeed?: exclusively pumped for 6 months with her two older children  Previous Maternal Breastfeeding Challenges: Exclusive pump and bottle fed  Exclusive Pump and Bottle Feed: Yes    Maternal Assessment  Breast Assessment: Large, Symmetrical, Soft, Compressible  Nipple Assessment: Intact, Erect  Areola Assessment: Normal    Infant Assessment  Infant Behavior: Sleepy    Feeding Assessment  Nutrition Source: Formula (per mother’s request), Breastmilk    LATCH TOOL       Breast Pump  Pump: Hospital grade electric pump, Double breast pumping  Frequency: 3-4 times per day  Breast Shield Size and Type: 21 mm  Units of Volume: Other (comment) (no colostrum expresssed yet)    Other OB Lactation Tools  Lactation Tools: Flanges (measured maternal nipples at 18mm bilaterally- instructed mother to use 21mm when pumpng)    Patient Follow-up  Inpatient Lactation Follow-up Needed : Yes  Outpatient Lactation Follow-up: Recommended    Other OB Lactation Documentation  Maternal Risk Factors:  delivery  Infant Risk Factors: High birth weight >3600 g    Recommendations/Summary  Mother stated to me that she \"is unsure\" whether or not she will continue to pump. She expressed concern due to cramping experienced with her earlier pumping sessions. She shared that she has pumped \"about 3-4 times\" since delivery. Educated mother on the normalcy of this and discussed ways to help minimize cramps when pumping. Mother has pumped in the past with her two older children for six months. Instructed mother to use the size 21mm flanges when pumping should she continue after measuring her nipple diameter. Provided mother " with cdc handout and PI sheet #123 for reference. Visit was brief as mother is unsure of her plan at this time.

## 2025-07-10 PROCEDURE — 2500000001 HC RX 250 WO HCPCS SELF ADMINISTERED DRUGS (ALT 637 FOR MEDICARE OP): Performed by: NURSE PRACTITIONER

## 2025-07-10 PROCEDURE — 2500000005 HC RX 250 GENERAL PHARMACY W/O HCPCS

## 2025-07-10 PROCEDURE — 2500000001 HC RX 250 WO HCPCS SELF ADMINISTERED DRUGS (ALT 637 FOR MEDICARE OP)

## 2025-07-10 PROCEDURE — 1100000001 HC PRIVATE ROOM DAILY

## 2025-07-10 PROCEDURE — 2500000004 HC RX 250 GENERAL PHARMACY W/ HCPCS (ALT 636 FOR OP/ED)

## 2025-07-10 RX ORDER — IBUPROFEN 600 MG/1
600 TABLET, FILM COATED ORAL EVERY 6 HOURS
Qty: 60 TABLET | Refills: 3 | Status: SHIPPED | OUTPATIENT
Start: 2025-07-10

## 2025-07-10 RX ORDER — POLYETHYLENE GLYCOL 3350 17 G/17G
17 POWDER, FOR SOLUTION ORAL 2 TIMES DAILY PRN
Qty: 3 PACKET | Refills: 2 | Status: SHIPPED | OUTPATIENT
Start: 2025-07-10

## 2025-07-10 RX ORDER — ACETAMINOPHEN 325 MG/1
975 TABLET ORAL EVERY 6 HOURS
Qty: 60 TABLET | Refills: 3 | Status: SHIPPED | OUTPATIENT
Start: 2025-07-10

## 2025-07-10 RX ORDER — VENLAFAXINE HYDROCHLORIDE 37.5 MG/1
37.5 CAPSULE, EXTENDED RELEASE ORAL
Qty: 90 CAPSULE | Refills: 3 | Status: SHIPPED | OUTPATIENT
Start: 2025-07-11

## 2025-07-10 RX ORDER — OXYCODONE HYDROCHLORIDE 5 MG/1
5 TABLET ORAL EVERY 4 HOURS PRN
Qty: 15 TABLET | Refills: 0 | Status: SHIPPED | OUTPATIENT
Start: 2025-07-10

## 2025-07-10 RX ORDER — VENLAFAXINE HYDROCHLORIDE 37.5 MG/1
37.5 CAPSULE, EXTENDED RELEASE ORAL DAILY
Qty: 90 CAPSULE | Refills: 3 | Status: SHIPPED | OUTPATIENT
Start: 2025-07-10

## 2025-07-10 RX ORDER — LIDOCAINE 560 MG/1
1 PATCH PERCUTANEOUS; TOPICAL; TRANSDERMAL
Qty: 5 PATCH | Refills: 1 | Status: SHIPPED | OUTPATIENT
Start: 2025-07-10

## 2025-07-10 RX ADMIN — OXYCODONE HYDROCHLORIDE 10 MG: 10 TABLET ORAL at 20:15

## 2025-07-10 RX ADMIN — SIMETHICONE 80 MG: 80 TABLET, CHEWABLE ORAL at 20:15

## 2025-07-10 RX ADMIN — IBUPROFEN 600 MG: 600 TABLET ORAL at 12:27

## 2025-07-10 RX ADMIN — IBUPROFEN 600 MG: 600 TABLET ORAL at 00:30

## 2025-07-10 RX ADMIN — ACETAMINOPHEN 975 MG: 325 TABLET ORAL at 00:30

## 2025-07-10 RX ADMIN — POLYETHYLENE GLYCOL 3350 17 G: 17 POWDER, FOR SOLUTION ORAL at 10:08

## 2025-07-10 RX ADMIN — LIDOCAINE 1 PATCH: 4 PATCH TOPICAL at 02:58

## 2025-07-10 RX ADMIN — ENOXAPARIN SODIUM 40 MG: 100 INJECTION SUBCUTANEOUS at 18:36

## 2025-07-10 RX ADMIN — ACETAMINOPHEN 975 MG: 325 TABLET ORAL at 12:27

## 2025-07-10 RX ADMIN — ACETAMINOPHEN 975 MG: 325 TABLET ORAL at 18:36

## 2025-07-10 RX ADMIN — ACETAMINOPHEN 975 MG: 325 TABLET ORAL at 06:26

## 2025-07-10 RX ADMIN — OXYCODONE HYDROCHLORIDE 10 MG: 10 TABLET ORAL at 15:45

## 2025-07-10 RX ADMIN — SIMETHICONE 80 MG: 80 TABLET, CHEWABLE ORAL at 06:26

## 2025-07-10 RX ADMIN — IBUPROFEN 600 MG: 600 TABLET ORAL at 18:36

## 2025-07-10 RX ADMIN — OXYCODONE HYDROCHLORIDE 10 MG: 10 TABLET ORAL at 11:06

## 2025-07-10 RX ADMIN — OXYCODONE HYDROCHLORIDE 10 MG: 10 TABLET ORAL at 06:58

## 2025-07-10 RX ADMIN — OXYCODONE HYDROCHLORIDE 10 MG: 10 TABLET ORAL at 02:58

## 2025-07-10 RX ADMIN — VENLAFAXINE HYDROCHLORIDE 37.5 MG: 37.5 CAPSULE, EXTENDED RELEASE ORAL at 10:07

## 2025-07-10 RX ADMIN — IBUPROFEN 600 MG: 600 TABLET ORAL at 06:26

## 2025-07-10 ASSESSMENT — PAIN SCALES - GENERAL
PAINLEVEL_OUTOF10: 6
PAINLEVEL_OUTOF10: 9
PAINLEVEL_OUTOF10: 7
PAINLEVEL_OUTOF10: 8

## 2025-07-10 ASSESSMENT — PAIN - FUNCTIONAL ASSESSMENT
PAIN_FUNCTIONAL_ASSESSMENT: 0-10
PAIN_FUNCTIONAL_ASSESSMENT: 0-10

## 2025-07-10 ASSESSMENT — PAIN DESCRIPTION - DESCRIPTORS: DESCRIPTORS: SORE

## 2025-07-10 NOTE — PROGRESS NOTES
Postpartum Progress Note    Assessment/Plan   Linda Bhardwaj is a 39 y.o., , who delivered at 39w2d gestation and is now postpartum day 2.  She had  #3 with bilateral salpingectomies on 2025 after spontaneous labor preceded the planned  section.  She delivered a girl weighing 8 pounds 3 ounces, Apgars 8, 9.  There was some dense adhesions between the fascia and rectus muscle, no complications.  Patient had episode of increased pain yesterday, now tolerating incisional discomfort with Tylenol, Motrin and 10 mg oxycodone every 4 hours.  Using MiraLAX for constipation, had a BM.  No dysuria, minimal lochia, ambulating well.  Tolerating p.o. well.  Planning to bottlefeed, has tried pumping.  Meeting milestones, plan home tomorrow on postop day #3 (2025).  Follow-up 2 weeks in office for incision check.      Subjective   Her pain is well controlled with current medications  She is passing flatus  She is ambulating well  She is tolerating a Adult diet Regular  She reports no breast or nursing problems  She denies emotional concerns today , planning to restart effexor 37.5 mg today.  Her plan for contraception is status post bilateral salpingectomies.       Objective     Last Vitals:  Temp Pulse Resp BP MAP Pulse Ox   36.2 °C (97.2 °F) 98 18 123/77 92 96 %     Vitals Min/Max Last 24 Hours:  Temp  Min: 36 °C (96.8 °F)  Max: 36.4 °C (97.5 °F)  Pulse  Min: 90  Max: 100  Resp  Min: 16  Max: 18  BP  Min: 123/77  Max: 135/81  MAP (mmHg)  Min: 92  Max: 101    Intake/Output:   No intake or output data in the 24 hours ending 07/10/25 1323    Physical Exam:  General: Examination reveals a well developed, well nourished, female, in no acute distress. She is alert and cooperative.  Constitutional: Alert and in no acute distress. Well developed, well nourished.   Head and Face: Head and face: Normal.    Eyes: Normal external exam - nonicteric sclera.  Pulmonary: No respiratory distress.    Abdomen:  Soft nontender; incision is clean, dry, intact.  Normal active bowel sounds noted.  Musculoskeletal:  normal movements of all extremities.  No lower extremity edema, no calf pain.  Neurologic: Non-focal. Grossly intact.   Psychiatric: Alert and oriented x 3. Affect normal to patient baseline. Mood: Appropriate.    Lab Data:  Lab Results   Component Value Date    WBC 8.6 07/09/2025    HGB 9.8 (L) 07/09/2025    HCT 31.1 (L) 07/09/2025     (L) 07/09/2025

## 2025-07-10 NOTE — DISCHARGE SUMMARY
"Discharge Summary    Admission Date: 2025  Discharge Date: 25    Discharge Diagnosis  Normal labor ([facility]), repeat  section #3 with bilateral salpingectomies.    Hospital Course  Delivery Date: 2025 5:02 AM  Delivery type: , Low Transverse   GA at delivery: 39w2d  Outcome: Living, girl (?\"Moody\"), 8#3oz, Apgars 8,9.   Anesthesia during delivery: Combined spinal-epidural  Intrapartum complications: None  Feeding method: Breastfeeding Status: No     Procedures: none  Contraception at discharge: Bilateral salpingectomies performed at time of  section.    Linda Bhardwaj is a 39 y.o., , who delivered at 39w2d gestation and is now postpartum day 2.  She had  #3 with bilateral salpingectomies on 2025 after spontaneous labor preceded the planned  section.  She delivered a girl weighing 8 pounds 3 ounces, Apgars 8, 9.  There was some dense adhesions between the fascia and rectus muscle, no complications.  Patient had episode of increased pain yesterday, now tolerating incisional discomfort with Tylenol, Motrin and 10 mg oxycodone every 4 hours.  Using MiraLAX for constipation, had a BM.  No dysuria, minimal lochia, ambulating well.  Tolerating p.o. well.  Planning to bottlefeed, has tried pumping.  Meeting milestones, plan home tomorrow on postop day #3 (2025).  Follow-up 2 weeks in office for incision check.    Pertinent Physical Exam At Time of Discharge  GENERAL: Examination reveals a well developed, well nourished, gravid female in no acute distress. She is alert and cooperative.  General: Examination reveals a well developed, well nourished, female, in no acute distress. She is alert and cooperative.    Last Vitals:  Temp Pulse Resp BP MAP Pulse Ox   36.2 °C (97.2 °F) 98 18 123/77 92 96 %     Discharge Meds     Your medication list        START taking these medications        Instructions Last Dose Given Next Dose Due   acetaminophen 325 mg " tablet  Commonly known as: Tylenol      Take 3 tablets (975 mg) by mouth every 6 hours.       ibuprofen 600 mg tablet      Take 1 tablet (600 mg) by mouth every 6 hours.       lidocaine 4 % patch      Place 1 patch over 12 hours on the skin every 24 (twenty four) hours if needed for moderate pain (4 - 6) or mild pain (1 - 3). Remove & discard patch within 12 hours or as directed by MD.       oxyCODONE 5 mg immediate release tablet  Commonly known as: Roxicodone      Take 1 tablet (5 mg) by mouth every 4 hours if needed (Pain score 6-8).       polyethylene glycol 17 gram packet  Commonly known as: Glycolax, Miralax      Take 17 g by mouth 2 times a day as needed (first line).              CHANGE how you take these medications        Instructions Last Dose Given Next Dose Due   venlafaxine XR 37.5 mg 24 hr capsule  Commonly known as: Effexor-XR  What changed: You were already taking a medication with the same name, and this prescription was added. Make sure you understand how and when to take each.      Take 1 capsule (37.5 mg) by mouth once daily. Do not crush or chew.       venlafaxine XR 37.5 mg 24 hr capsule  Commonly known as: Effexor-XR  Start taking on: July 11, 2025  What changed:   medication strength  how much to take  when to take this  additional instructions      Take 1 capsule (37.5 mg) by mouth once daily with breakfast. Do not crush or chew.              CONTINUE taking these medications        Instructions Last Dose Given Next Dose Due   ergocalciferol 1250 mcg (50,000 units) capsule  Commonly known as: Vitamin D2      Take 1 capsule (1,250 mcg) by mouth 1 (one) time per week.       metoclopramide 10 mg tablet  Commonly known as: Reglan      Take 1 tablet (10 mg) by mouth 4 times a day for 10 days.       Prenatal 19 29 mg iron- 1 mg tablet  Generic drug: -iron fum-folic acid                     Where to Get Your Medications        These medications were sent to [id number] -  [facility], [place] - [address]  [address] [facility] [address]      Phone: [phone]   acetaminophen 325 mg tablet  ibuprofen 600 mg tablet  lidocaine 4 % patch  oxyCODONE 5 mg immediate release tablet  polyethylene glycol 17 gram packet  venlafaxine XR 37.5 mg 24 hr capsule  venlafaxine XR 37.5 mg 24 hr capsule          Complications Requiring Follow-Up  Plan routine 2-week follow-up incision check.    Test Results Pending At Discharge  Pending Labs       Order Current Status    Surgical Pathology Exam - FALLOPIAN TUBE SALPINGECTOMY LEFT In process    Surgical Pathology Exam - FALLOPIAN TUBE SALPINGECTOMY RIGHT In process            Outpatient Follow-Up  No future appointments.    I spent 50 minutes in the professional and overall care of this patient.      Suzi Vinson MD

## 2025-07-10 NOTE — LACTATION NOTE
Lactation Consultant Note  Lactation Consultation  Reason for Consult: Follow-up assessment  Consultant Name: PAOLA Miguel, RN IBCLC    Maternal Information       Maternal Assessment       Infant Assessment       Feeding Assessment       LATCH TOOL       Breast Pump       Other OB Lactation Tools       Patient Follow-up       Other OB Lactation Documentation       Recommendations/Summary    Per  RN, the patient is no longer pumping to establish her milk supply.

## 2025-07-10 NOTE — CARE PLAN
The patient's goals for the shift include Pain  control.    The clinical goals for the shift include stable vitals    Problem: Vaginal Birth or  Section  Goal: Minimal s/sx of HDP and BP<160/110  Outcome: Progressing     Problem: Vaginal Birth or  Section  Goal: No s/sx of infection through recovery  Outcome: Progressing     Problem: Vaginal Birth or  Section  Goal: No s/sx of hemorrhage through recovery  Outcome: Progressing     Problem: Pain - Adult  Goal: Verbalizes/displays adequate comfort level or baseline comfort level  Outcome: Progressing     Problem: Safety - Adult  Goal: Free from fall injury  Outcome: Progressing

## 2025-07-10 NOTE — CARE PLAN
Problem: Pain - Adult  Goal: Verbalizes/displays adequate comfort level or baseline comfort level  Outcome: Progressing     Problem: Postpartum  Goal: Experiences normal postpartum course  Outcome: Progressing    Patient's VS and assessment WDL during this shift. Meeting all postpartum milestones. Patient states that pain is better controlled than yesterday, will continue bowel regimen as well. Will continue to monitor.

## 2025-07-11 VITALS
HEART RATE: 79 BPM | WEIGHT: 180.78 LBS | HEIGHT: 63 IN | DIASTOLIC BLOOD PRESSURE: 82 MMHG | BODY MASS INDEX: 32.03 KG/M2 | SYSTOLIC BLOOD PRESSURE: 137 MMHG | OXYGEN SATURATION: 97 % | TEMPERATURE: 98.1 F | RESPIRATION RATE: 16 BRPM

## 2025-07-11 PROCEDURE — 2500000004 HC RX 250 GENERAL PHARMACY W/ HCPCS (ALT 636 FOR OP/ED)

## 2025-07-11 PROCEDURE — 2500000001 HC RX 250 WO HCPCS SELF ADMINISTERED DRUGS (ALT 637 FOR MEDICARE OP): Performed by: NURSE PRACTITIONER

## 2025-07-11 PROCEDURE — 2500000001 HC RX 250 WO HCPCS SELF ADMINISTERED DRUGS (ALT 637 FOR MEDICARE OP)

## 2025-07-11 PROCEDURE — 2500000005 HC RX 250 GENERAL PHARMACY W/O HCPCS

## 2025-07-11 RX ADMIN — OXYCODONE HYDROCHLORIDE 10 MG: 10 TABLET ORAL at 08:12

## 2025-07-11 RX ADMIN — LIDOCAINE 1 PATCH: 4 PATCH TOPICAL at 04:30

## 2025-07-11 RX ADMIN — VENLAFAXINE HYDROCHLORIDE 37.5 MG: 37.5 CAPSULE, EXTENDED RELEASE ORAL at 08:06

## 2025-07-11 RX ADMIN — IBUPROFEN 600 MG: 600 TABLET ORAL at 12:11

## 2025-07-11 RX ADMIN — OXYCODONE HYDROCHLORIDE 10 MG: 10 TABLET ORAL at 04:30

## 2025-07-11 RX ADMIN — IBUPROFEN 600 MG: 600 TABLET ORAL at 06:49

## 2025-07-11 RX ADMIN — ACETAMINOPHEN 975 MG: 325 TABLET ORAL at 00:19

## 2025-07-11 RX ADMIN — IBUPROFEN 600 MG: 600 TABLET ORAL at 00:19

## 2025-07-11 RX ADMIN — POLYETHYLENE GLYCOL 3350 17 G: 17 POWDER, FOR SOLUTION ORAL at 08:12

## 2025-07-11 RX ADMIN — ACETAMINOPHEN 975 MG: 325 TABLET ORAL at 12:11

## 2025-07-11 RX ADMIN — ACETAMINOPHEN 975 MG: 325 TABLET ORAL at 06:49

## 2025-07-11 RX ADMIN — OXYCODONE HYDROCHLORIDE 10 MG: 10 TABLET ORAL at 00:20

## 2025-07-11 RX ADMIN — OXYCODONE HYDROCHLORIDE 10 MG: 10 TABLET ORAL at 12:11

## 2025-07-11 ASSESSMENT — PAIN SCALES - GENERAL: PAINLEVEL_OUTOF10: 8

## 2025-07-12 LAB
BLOOD EXPIRATION DATE: NORMAL
DISPENSE STATUS: NORMAL
PRODUCT BLOOD TYPE: 9500
PRODUCT CODE: NORMAL
UNIT ABO: NORMAL
UNIT NUMBER: NORMAL
UNIT RH: NORMAL
UNIT VOLUME: 350
XM INTEP: NORMAL

## 2025-07-21 LAB
LABORATORY COMMENT REPORT: NORMAL
LABORATORY COMMENT REPORT: NORMAL
PATH REPORT.FINAL DX SPEC: NORMAL
PATH REPORT.FINAL DX SPEC: NORMAL
PATH REPORT.GROSS SPEC: NORMAL
PATH REPORT.GROSS SPEC: NORMAL
PATH REPORT.RELEVANT HX SPEC: NORMAL
PATH REPORT.RELEVANT HX SPEC: NORMAL
PATH REPORT.TOTAL CANCER: NORMAL
PATH REPORT.TOTAL CANCER: NORMAL

## 2025-07-24 ENCOUNTER — APPOINTMENT (OUTPATIENT)
Facility: CLINIC | Age: 40
End: 2025-07-24
Payer: COMMERCIAL

## 2025-07-24 VITALS
BODY MASS INDEX: 29.23 KG/M2 | WEIGHT: 165 LBS | DIASTOLIC BLOOD PRESSURE: 85 MMHG | SYSTOLIC BLOOD PRESSURE: 130 MMHG | HEART RATE: 109 BPM

## 2025-07-24 DIAGNOSIS — Z78.9 PRESENCE OF SURGICAL INCISION: Primary | ICD-10-CM

## 2025-07-24 PROCEDURE — 0503F POSTPARTUM CARE VISIT: CPT | Performed by: OBSTETRICS & GYNECOLOGY

## 2025-07-24 NOTE — PROGRESS NOTES
"Postpartum Progress Note    Assessment/Plan   Linda Bhardwaj is a 39 y.o., , who delivered at 39w2d gestation and is now postpartum day 16.  She is here for incision check.  She had a repeat  #3 with bilateral salpingectomies on 2025 at 39.2 weeks.  She delivered a girl weighing 8 pounds 3 ounces \"Moody\".  She is bottlefeeding.  She states there are no incisional concerns.  She is using Tylenol and Motrin.  No drainage or redness.  She has used MiraLAX to resolve a week of constipation.  Her bowels are now regular, no dysuria, no vaginal discharge.  She is on Effexor for the last 2 weeks and has no mood concerns.  She does state that her  is working from home which has been quite helpful.  A/P: 89-year-old  4 para 3-0-1-3 with a normal incision check after repeat  #3 on 2025.  She will continue Effexor.  Her blood pressure was mild range 130/85 she has no concerns.  No symptoms.  Recommend routine follow-up in 4 weeks for her 6 weeks postpartum visit.  Subjective   Her pain is well controlled with current medications  She is passing flatus  She is ambulating well  She is tolerating a No diet orders on file  She reports no breast or nursing problems  She denies emotional concerns today   Her plan for contraception is bilateral salpingectomy.       Objective     Last Vitals:  Temp Pulse Resp BP MAP Pulse Ox     109   130/85         Vitals Min/Max Last 24 Hours:  @FLOWSTAT(6,8,9,5,734427:24::1)@    Intake/Output:   [unfilled]    Physical Exam:  General: Examination reveals a well developed, well nourished, female, in no acute distress. She is alert and cooperative.  On exam, the incision is healing well, intact, no drainage or redness.  Nontender.  No lower extremity edema.  Lab Data:  Lab Results   Component Value Date    WBC 8.6 2025    HGB 9.8 (L) 2025    HCT 31.1 (L) 2025     (L) 2025     Lab Results   Component Value Date    GLUCOSE 87 " 07/08/2025     (L) 07/08/2025    K 3.9 07/08/2025     07/08/2025    CO2 20 (L) 07/08/2025    ANIONGAP 17 07/08/2025    BUN 6 07/08/2025    CREATININE 0.58 07/08/2025    EGFR >90 07/08/2025    CALCIUM 9.0 07/08/2025    ALBUMIN 3.6 07/08/2025    PROT 6.3 (L) 07/08/2025    ALKPHOS 141 (H) 07/08/2025    ALT 14 07/08/2025    AST 25 07/08/2025    BILITOT 0.4 07/08/2025

## 2025-08-15 DIAGNOSIS — G43.709 CHRONIC MIGRAINE WITHOUT AURA WITHOUT STATUS MIGRAINOSUS, NOT INTRACTABLE: ICD-10-CM

## 2025-08-15 RX ORDER — RIZATRIPTAN BENZOATE 10 MG/1
10 TABLET, ORALLY DISINTEGRATING ORAL ONCE AS NEEDED
Qty: 20 TABLET | Refills: 3 | Status: SHIPPED | OUTPATIENT
Start: 2025-08-15 | End: 2025-11-03

## 2025-08-21 ENCOUNTER — APPOINTMENT (OUTPATIENT)
Facility: CLINIC | Age: 40
End: 2025-08-21
Payer: COMMERCIAL

## 2025-08-21 VITALS
SYSTOLIC BLOOD PRESSURE: 105 MMHG | WEIGHT: 157 LBS | HEIGHT: 63 IN | DIASTOLIC BLOOD PRESSURE: 73 MMHG | BODY MASS INDEX: 27.82 KG/M2

## 2025-08-21 PROBLEM — F41.9 ANXIETY AND DEPRESSION: Status: RESOLVED | Noted: 2025-05-21 | Resolved: 2025-08-21

## 2025-08-21 PROBLEM — Z29.13 NEED FOR RHOGAM DUE TO RH NEGATIVE MOTHER: Status: RESOLVED | Noted: 2024-12-04 | Resolved: 2025-08-21

## 2025-08-21 PROBLEM — Z98.890 H/O LEEP: Status: RESOLVED | Noted: 2024-12-04 | Resolved: 2025-08-21

## 2025-08-21 PROBLEM — F32.A ANXIETY AND DEPRESSION: Status: RESOLVED | Noted: 2025-05-21 | Resolved: 2025-08-21

## 2025-08-21 PROBLEM — O34.219 PREVIOUS CESAREAN SECTION COMPLICATING PREGNANCY (HHS-HCC): Status: RESOLVED | Noted: 2024-12-04 | Resolved: 2025-08-21

## 2025-08-21 PROCEDURE — 0503F POSTPARTUM CARE VISIT: CPT | Performed by: OBSTETRICS & GYNECOLOGY

## 2025-08-21 ASSESSMENT — EDINBURGH POSTNATAL DEPRESSION SCALE (EPDS)
I HAVE BLAMED MYSELF UNNECESSARILY WHEN THINGS WENT WRONG: NO, NEVER
I HAVE FELT SCARED OR PANICKY FOR NO GOOD REASON: NO, NOT AT ALL
THINGS HAVE BEEN GETTING ON TOP OF ME: NO, I HAVE BEEN COPING AS WELL AS EVER
I HAVE LOOKED FORWARD WITH ENJOYMENT TO THINGS: AS MUCH AS I EVER DID
TOTAL SCORE: 0
I HAVE BEEN SO UNHAPPY THAT I HAVE BEEN CRYING: NO, NEVER
THE THOUGHT OF HARMING MYSELF HAS OCCURRED TO ME: NEVER
I HAVE BEEN ABLE TO LAUGH AND SEE THE FUNNY SIDE OF THINGS: AS MUCH AS I ALWAYS COULD
I HAVE BEEN ANXIOUS OR WORRIED FOR NO GOOD REASON: NO, NOT AT ALL
I HAVE BEEN SO UNHAPPY THAT I HAVE HAD DIFFICULTY SLEEPING: NOT AT ALL
I HAVE FELT SAD OR MISERABLE: NO, NOT AT ALL

## 2025-08-21 ASSESSMENT — PATIENT HEALTH QUESTIONNAIRE - PHQ9
2. FEELING DOWN, DEPRESSED OR HOPELESS: NOT AT ALL
SUM OF ALL RESPONSES TO PHQ9 QUESTIONS 1 AND 2: 0
1. LITTLE INTEREST OR PLEASURE IN DOING THINGS: NOT AT ALL

## 2025-08-21 ASSESSMENT — ENCOUNTER SYMPTOMS
DEPRESSION: 0
LOSS OF SENSATION IN FEET: 0
OCCASIONAL FEELINGS OF UNSTEADINESS: 0

## 2025-08-21 ASSESSMENT — COLUMBIA-SUICIDE SEVERITY RATING SCALE - C-SSRS
6. HAVE YOU EVER DONE ANYTHING, STARTED TO DO ANYTHING, OR PREPARED TO DO ANYTHING TO END YOUR LIFE?: NO
2. HAVE YOU ACTUALLY HAD ANY THOUGHTS OF KILLING YOURSELF?: NO
1. IN THE PAST MONTH, HAVE YOU WISHED YOU WERE DEAD OR WISHED YOU COULD GO TO SLEEP AND NOT WAKE UP?: NO

## 2026-05-20 ENCOUNTER — APPOINTMENT (OUTPATIENT)
Dept: OBSTETRICS AND GYNECOLOGY | Facility: CLINIC | Age: 41
End: 2026-05-20
Payer: COMMERCIAL

## (undated) DEVICE — DRAPE PACK, CESAREAN SECTION, CUSTOM, UHC

## (undated) DEVICE — SUTURE, VICRYL, 4-0, 27 IN, PS-1, UNDYED

## (undated) DEVICE — GLOVE, SURGICAL, PROTEXIS PI MICRO, 6.5, PF, LF

## (undated) DEVICE — SUTURE, VICRYL 0, 36 IN, CT-1, VIOLET

## (undated) DEVICE — SUTURE, VICRYL, 0, 36 IN, CT, UNDYED

## (undated) DEVICE — TOWEL PACK, STERILE, 4/PACK, BLUE

## (undated) DEVICE — SUTURE, MONOCRYL, 0, 18 IN, VIOLET